# Patient Record
Sex: FEMALE | Race: BLACK OR AFRICAN AMERICAN | NOT HISPANIC OR LATINO | Employment: OTHER | URBAN - METROPOLITAN AREA
[De-identification: names, ages, dates, MRNs, and addresses within clinical notes are randomized per-mention and may not be internally consistent; named-entity substitution may affect disease eponyms.]

---

## 2017-10-07 ENCOUNTER — HOSPITAL ENCOUNTER (OUTPATIENT)
Facility: HOSPITAL | Age: 47
Setting detail: OBSERVATION
End: 2017-10-10
Attending: EMERGENCY MEDICINE | Admitting: INTERNAL MEDICINE
Payer: MEDICAID

## 2017-10-07 ENCOUNTER — APPOINTMENT (EMERGENCY)
Dept: RADIOLOGY | Facility: HOSPITAL | Age: 47
End: 2017-10-07
Payer: MEDICAID

## 2017-10-07 DIAGNOSIS — T51.2X1A: ICD-10-CM

## 2017-10-07 DIAGNOSIS — R45.851 SUICIDAL IDEATION: ICD-10-CM

## 2017-10-07 DIAGNOSIS — R40.4 ALTERED AWARENESS, TRANSIENT: Primary | ICD-10-CM

## 2017-10-07 PROBLEM — R41.82 ALTERED MENTAL STATUS: Status: ACTIVE | Noted: 2017-10-07

## 2017-10-07 PROBLEM — F19.10 DRUG ABUSE (HCC): Status: ACTIVE | Noted: 2017-10-07

## 2017-10-07 PROBLEM — G92.8 TOXIC METABOLIC ENCEPHALOPATHY: Status: ACTIVE | Noted: 2017-10-07

## 2017-10-07 LAB
ACETONE SERPL-MCNC: NEGATIVE MG/DL
ALBUMIN SERPL BCP-MCNC: 4.1 G/DL (ref 3.5–5)
ALP SERPL-CCNC: 83 U/L (ref 46–116)
ALT SERPL W P-5'-P-CCNC: 26 U/L (ref 12–78)
AMPHETAMINES SERPL QL SCN: NEGATIVE
ANION GAP SERPL CALCULATED.3IONS-SCNC: 11 MMOL/L (ref 4–13)
ANION GAP SERPL CALCULATED.3IONS-SCNC: 11 MMOL/L (ref 4–13)
APAP SERPL-MCNC: <2 UG/ML (ref 10–30)
APTT PPP: 24 SECONDS (ref 24–33)
ARTERIAL PATENCY WRIST A: YES
AST SERPL W P-5'-P-CCNC: 17 U/L (ref 5–45)
BACTERIA UR QL AUTO: ABNORMAL /HPF
BARBITURATES UR QL: NEGATIVE
BASE EXCESS BLDA CALC-SCNC: -1.4 MMOL/L
BASOPHILS # BLD AUTO: 0 THOUSANDS/ΜL (ref 0–0.1)
BASOPHILS NFR BLD AUTO: 0 % (ref 0–1)
BENZODIAZ UR QL: NEGATIVE
BILIRUB SERPL-MCNC: 0.3 MG/DL (ref 0.2–1)
BILIRUB UR QL STRIP: NEGATIVE
BODY TEMPERATURE: 97.5 DEGREES FEHRENHEIT
BUN SERPL-MCNC: 6 MG/DL (ref 5–25)
BUN SERPL-MCNC: 9 MG/DL (ref 5–25)
CALCIUM SERPL-MCNC: 8.4 MG/DL (ref 8.3–10.1)
CALCIUM SERPL-MCNC: 9.3 MG/DL (ref 8.3–10.1)
CHLORIDE SERPL-SCNC: 107 MMOL/L (ref 100–108)
CHLORIDE SERPL-SCNC: 112 MMOL/L (ref 100–108)
CLARITY UR: CLEAR
CO2 SERPL-SCNC: 27 MMOL/L (ref 21–32)
CO2 SERPL-SCNC: 28 MMOL/L (ref 21–32)
COCAINE UR QL: NEGATIVE
COLOR UR: ABNORMAL
CREAT SERPL-MCNC: 1.32 MG/DL (ref 0.6–1.3)
CREAT SERPL-MCNC: 2.09 MG/DL (ref 0.6–1.3)
EOSINOPHIL # BLD AUTO: 0.1 THOUSAND/ΜL (ref 0–0.61)
EOSINOPHIL NFR BLD AUTO: 1 % (ref 0–6)
ERYTHROCYTE [DISTWIDTH] IN BLOOD BY AUTOMATED COUNT: 15.6 % (ref 11.6–15.1)
ETHANOL SERPL-MCNC: <3 MG/DL (ref 0–3)
GFR SERPL CREATININE-BSD FRML MDRD: 31 ML/MIN/1.73SQ M
GFR SERPL CREATININE-BSD FRML MDRD: 47 ML/MIN/1.73SQ M
GLUCOSE P FAST SERPL-MCNC: 116 MG/DL (ref 65–99)
GLUCOSE SERPL-MCNC: 116 MG/DL (ref 65–140)
GLUCOSE SERPL-MCNC: 123 MG/DL (ref 65–140)
GLUCOSE SERPL-MCNC: 132 MG/DL (ref 65–140)
GLUCOSE UR STRIP-MCNC: NEGATIVE MG/DL
HCO3 BLDA-SCNC: 23.8 MMOL/L (ref 22–28)
HCT VFR BLD AUTO: 47.6 % (ref 37–47)
HGB BLD-MCNC: 15.5 G/DL (ref 12–16)
HGB UR QL STRIP.AUTO: ABNORMAL
INR PPP: 0.99 (ref 0.86–1.16)
KETONES UR STRIP-MCNC: NEGATIVE MG/DL
LEUKOCYTE ESTERASE UR QL STRIP: NEGATIVE
LYMPHOCYTES # BLD AUTO: 1.6 THOUSANDS/ΜL (ref 0.6–4.47)
LYMPHOCYTES NFR BLD AUTO: 17 % (ref 14–44)
MCH RBC QN AUTO: 28.2 PG (ref 27–31)
MCHC RBC AUTO-ENTMCNC: 32.6 G/DL (ref 31.4–37.4)
MCV RBC AUTO: 87 FL (ref 82–98)
METHADONE UR QL: NEGATIVE
MONOCYTES # BLD AUTO: 0.3 THOUSAND/ΜL (ref 0.17–1.22)
MONOCYTES NFR BLD AUTO: 3 % (ref 4–12)
NEUTROPHILS # BLD AUTO: 7.7 THOUSANDS/ΜL (ref 1.85–7.62)
NEUTS SEG NFR BLD AUTO: 79 % (ref 43–75)
NITRITE UR QL STRIP: NEGATIVE
NON VENT ROOM AIR: ABNORMAL %
NON-SQ EPI CELLS URNS QL MICRO: ABNORMAL /HPF
NRBC BLD AUTO-RTO: 0 /100 WBCS
O2 CT BLDA-SCNC: 20.1 ML/DL (ref 16–23)
OPIATES UR QL SCN: NEGATIVE
OXYHGB MFR BLDA: 91.5 % (ref 94–97)
PCO2 BLDA: 42.2 MM HG (ref 36–44)
PCO2 TEMP ADJ BLDA: 41.1 MM HG (ref 36–44)
PCP UR QL: NEGATIVE
PH BLD: 7.38 [PH] (ref 7.35–7.45)
PH BLDA: 7.37 [PH] (ref 7.35–7.45)
PH UR STRIP.AUTO: 6.5 [PH] (ref 5–9)
PLATELET # BLD AUTO: 228 THOUSANDS/UL (ref 130–400)
PLATELET # BLD AUTO: 263 THOUSANDS/UL (ref 130–400)
PMV BLD AUTO: 7.7 FL (ref 8.9–12.7)
PMV BLD AUTO: 8 FL (ref 8.9–12.7)
PO2 BLD: 84.7 MM HG (ref 75–129)
PO2 BLDA: 88 MM HG (ref 75–129)
POTASSIUM SERPL-SCNC: 3.5 MMOL/L (ref 3.5–5.3)
POTASSIUM SERPL-SCNC: 3.6 MMOL/L (ref 3.5–5.3)
PROT SERPL-MCNC: 7.6 G/DL (ref 6.4–8.2)
PROT UR STRIP-MCNC: NEGATIVE MG/DL
PROTHROMBIN TIME: 10.4 SECONDS (ref 9.4–11.7)
RBC # BLD AUTO: 5.5 MILLION/UL (ref 4.2–5.4)
RBC #/AREA URNS AUTO: ABNORMAL /HPF
SALICYLATES SERPL-MCNC: 3.8 MG/DL (ref 3–20)
SODIUM SERPL-SCNC: 146 MMOL/L (ref 136–145)
SODIUM SERPL-SCNC: 150 MMOL/L (ref 136–145)
SP GR UR STRIP.AUTO: <=1.005 (ref 1–1.03)
SPECIMEN SOURCE: ABNORMAL
THC UR QL: NEGATIVE
TROPONIN I SERPL-MCNC: <0.02 NG/ML
TSH SERPL DL<=0.05 MIU/L-ACNC: 0.46 UIU/ML (ref 0.36–3.74)
UROBILINOGEN UR QL STRIP.AUTO: 0.2 E.U./DL
WBC # BLD AUTO: 9.8 THOUSAND/UL (ref 4.8–10.8)
WBC #/AREA URNS AUTO: ABNORMAL /HPF

## 2017-10-07 PROCEDURE — 82009 KETONE BODYS QUAL: CPT | Performed by: EMERGENCY MEDICINE

## 2017-10-07 PROCEDURE — 84443 ASSAY THYROID STIM HORMONE: CPT | Performed by: EMERGENCY MEDICINE

## 2017-10-07 PROCEDURE — 93005 ELECTROCARDIOGRAM TRACING: CPT | Performed by: EMERGENCY MEDICINE

## 2017-10-07 PROCEDURE — 87081 CULTURE SCREEN ONLY: CPT | Performed by: STUDENT IN AN ORGANIZED HEALTH CARE EDUCATION/TRAINING PROGRAM

## 2017-10-07 PROCEDURE — 85610 PROTHROMBIN TIME: CPT | Performed by: EMERGENCY MEDICINE

## 2017-10-07 PROCEDURE — 80320 DRUG SCREEN QUANTALCOHOLS: CPT | Performed by: STUDENT IN AN ORGANIZED HEALTH CARE EDUCATION/TRAINING PROGRAM

## 2017-10-07 PROCEDURE — 96360 HYDRATION IV INFUSION INIT: CPT

## 2017-10-07 PROCEDURE — 96361 HYDRATE IV INFUSION ADD-ON: CPT

## 2017-10-07 PROCEDURE — 80320 DRUG SCREEN QUANTALCOHOLS: CPT | Performed by: EMERGENCY MEDICINE

## 2017-10-07 PROCEDURE — 85025 COMPLETE CBC W/AUTO DIFF WBC: CPT | Performed by: EMERGENCY MEDICINE

## 2017-10-07 PROCEDURE — 80329 ANALGESICS NON-OPIOID 1 OR 2: CPT | Performed by: EMERGENCY MEDICINE

## 2017-10-07 PROCEDURE — 85730 THROMBOPLASTIN TIME PARTIAL: CPT | Performed by: EMERGENCY MEDICINE

## 2017-10-07 PROCEDURE — 80048 BASIC METABOLIC PNL TOTAL CA: CPT | Performed by: STUDENT IN AN ORGANIZED HEALTH CARE EDUCATION/TRAINING PROGRAM

## 2017-10-07 PROCEDURE — 85049 AUTOMATED PLATELET COUNT: CPT | Performed by: STUDENT IN AN ORGANIZED HEALTH CARE EDUCATION/TRAINING PROGRAM

## 2017-10-07 PROCEDURE — 70450 CT HEAD/BRAIN W/O DYE: CPT

## 2017-10-07 PROCEDURE — 36600 WITHDRAWAL OF ARTERIAL BLOOD: CPT

## 2017-10-07 PROCEDURE — 80307 DRUG TEST PRSMV CHEM ANLYZR: CPT | Performed by: EMERGENCY MEDICINE

## 2017-10-07 PROCEDURE — 36415 COLL VENOUS BLD VENIPUNCTURE: CPT | Performed by: EMERGENCY MEDICINE

## 2017-10-07 PROCEDURE — 99285 EMERGENCY DEPT VISIT HI MDM: CPT

## 2017-10-07 PROCEDURE — 81001 URINALYSIS AUTO W/SCOPE: CPT | Performed by: EMERGENCY MEDICINE

## 2017-10-07 PROCEDURE — 80053 COMPREHEN METABOLIC PANEL: CPT | Performed by: EMERGENCY MEDICINE

## 2017-10-07 PROCEDURE — 82805 BLOOD GASES W/O2 SATURATION: CPT | Performed by: EMERGENCY MEDICINE

## 2017-10-07 PROCEDURE — 84484 ASSAY OF TROPONIN QUANT: CPT | Performed by: EMERGENCY MEDICINE

## 2017-10-07 PROCEDURE — 82948 REAGENT STRIP/BLOOD GLUCOSE: CPT

## 2017-10-07 RX ORDER — NICOTINE 21 MG/24HR
1 PATCH, TRANSDERMAL 24 HOURS TRANSDERMAL DAILY
Status: DISCONTINUED | OUTPATIENT
Start: 2017-10-07 | End: 2017-10-08

## 2017-10-07 RX ORDER — SODIUM CHLORIDE 9 MG/ML
125 INJECTION, SOLUTION INTRAVENOUS CONTINUOUS
Status: DISCONTINUED | OUTPATIENT
Start: 2017-10-07 | End: 2017-10-10

## 2017-10-07 RX ORDER — LORAZEPAM 0.5 MG/1
0.5 TABLET ORAL ONCE AS NEEDED
Status: COMPLETED | OUTPATIENT
Start: 2017-10-07 | End: 2017-10-07

## 2017-10-07 RX ADMIN — LORAZEPAM 0.5 MG: 0.5 TABLET ORAL at 21:15

## 2017-10-07 RX ADMIN — NICOTINE 1 PATCH: 14 PATCH TRANSDERMAL at 15:38

## 2017-10-07 RX ADMIN — SODIUM CHLORIDE 125 ML/HR: 0.9 INJECTION, SOLUTION INTRAVENOUS at 15:39

## 2017-10-07 RX ADMIN — FOLIC ACID 1 MG: 5 INJECTION, SOLUTION INTRAMUSCULAR; INTRAVENOUS; SUBCUTANEOUS at 16:38

## 2017-10-07 RX ADMIN — ENOXAPARIN SODIUM 40 MG: 40 INJECTION SUBCUTANEOUS at 15:38

## 2017-10-07 RX ADMIN — THIAMINE HYDROCHLORIDE 100 MG: 100 INJECTION, SOLUTION INTRAMUSCULAR; INTRAVENOUS at 16:38

## 2017-10-07 RX ADMIN — SODIUM CHLORIDE 1000 ML: 0.9 INJECTION, SOLUTION INTRAVENOUS at 11:05

## 2017-10-07 NOTE — ED NOTES
Sleepy but rouses and goes back to sleep, denies using alcohol or drugs     Wendy Wallis, ANGEL  10/07/17 1128

## 2017-10-07 NOTE — ED NOTES
Remains sleepy but rousable fluids finnished  No family members present   Awaiting transport to floor     Vinny Sen RN  10/07/17 8895

## 2017-10-07 NOTE — ED NOTES
Straight cathed after becoming increasingly restless stating she had to use the bathroom attempted to get pt to use the bedpan, did not understand, not aware of what she is doing     Wendy Wallis, ANGEL  10/07/17 9279

## 2017-10-07 NOTE — H&P
History and Physical - [facility] Medicine    Patient Information: Cuate Higgins 50 y o  female MRN: [id number]  Unit/Bed#: [address] [facility] Encounter: [id number]  Admitting Physician: Teo Stroud MD  PCP: No primary care provider on file  Date of Admission:  10/07/17        Hospital Problem List:     Principal Problem:    Toxic metabolic encephalopathy  Active Problems:    Altered mental status    Isopropanol causing toxic effect, accidental or unintentional, initial encounter    Drug abuse      Assessment/Plan:  · Drug overdose, possibly isopropyl alcohol  Spoke to poison Control  Isopropyl alcohol poisoning is her altered by ketosis without acidosis  Patient's anion gap is normal and she is negative for acetone in the blood  BMP is normal however overdose can cause elevation in creatinine so will repeat BMP later this evening to monitor closely  Monitor electrolytes and replete as needed  IV fluid hydration  Neuro checks every 4 hours  · Toxic metabolic encephalopathy secondary to the above  CT of the head is normal   Urine tox screen was negative  · Reported history of drug abuse, heroin and alcohol  Alcohol level less than 3 on arrival   Urine tox negative  Start thiamine and folate supplementation  Monitor for signs of DT and treat withdrawal symptomatically  Will forego Ativan and Librium protocol right now is patient is lethargic  · 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  Chronic Per ED notes per her son  CT of the head was normal for acute intracranial abnormality  · Code status  Patient is unable to answer as to her wishes and her son was attempted to be reached by phone however he did not answer and has no answering machine  Will make patient full code by default        VTE Prophylaxis: Enoxaparin (Lovenox)  / sequential compression device   Code Status: Level 1 - Full Code    Anticipated Length of Stay:  Patient will be admitted on an Observation basis with an anticipated length of stay of  < 2 midnights  Justification for Hospital Stay: acute drug overdose requiring neuro monitoring and hydration    Total Time for Visit, including Counseling / Coordination of Care: 30 minutes  Greater than 50% of this total time spent on direct patient counseling and coordination of care  Chief Complaint:     Altered Mental Status (son found pt around 21  altered, slow to respond  son found an empty rubbing alcohol next to pt )      History of Present Illness:    Breanna King is a 50 y o  female with a reported PMH drug and alcohol abuse  who presents with altered mental status  Patient is not able to answer any questions and no family is at bedside or reachable by phone therefore entire history is obtained from the chart and ED note  Apparently patient son found her unresponsive at home today  She has a history of drug abuse, heroin and alcohol, and was found to have a half a bottle of rubbing alcohol by her bedside  She did admit to drinking last night to the ER doc  Her son is not aware of any medical history  Patient is not in the system and has never been admitted to this hospital     Review of Systems:    Review of Systems   Unable to perform ROS: Mental status change       Past Medical and Surgical History:     No past medical history on file  No past surgical history on file      Meds/Allergies:    no known PTA meds    Allergies: No Known Allergies    Social History:     Marital Status: Single     Substance Use History:   History   Alcohol Use    Yes     History   Smoking Status    Current Every Day Smoker    Packs/day: 0 50   Smokeless Tobacco    Not on file     History   Drug Use    Types: Heroin     Comment: heroin in the past per son       Family History:    unable to obtain    Physical Exam:     Vitals:   Blood Pressure: 104/61 (10/07/17 1255)  Pulse: 95 (10/07/17 1255)  Temperature: 98 °F (36 7 °C) (10/07/17 1255)  Temp Source: Tympanic (10/07/17 1255)  Respirations: 20 (10/07/17 1255)  Weight - Scale: 59 kg (130 lb) (10/07/17 1056)  SpO2: 98 % (10/07/17 1056)    Physical Exam   Constitutional: She appears well-developed  She appears lethargic  No distress  disheveled, appears chronically ill   HENT:   Head: Normocephalic and atraumatic  Dry mucous membranes   Eyes: Conjunctivae and EOM are normal  No scleral icterus  Right pupil a 6 mm nonreactive, left pupil is 2 mm and sluggish (per ER note, her son states this is normal after a hx of right eye injury)   Neck: Neck supple  No JVD present  Cardiovascular: Normal rate, regular rhythm and normal heart sounds  No murmur heard  Pulmonary/Chest: Effort normal and breath sounds normal  No respiratory distress  She has no wheezes  She has no rales  Repeated sighing   Abdominal: Soft  Bowel sounds are normal  She exhibits no distension  There is no tenderness  There is no rebound  Musculoskeletal: Normal range of motion  She exhibits no edema, tenderness or deformity  Neurological: She appears lethargic  No cranial nerve deficit  Repeated sighing  States her name and is oriented to place (hospital) but otherwise minimally verbal and not answering any questions     Skin: Skin is warm and dry  No rash noted  She is not diaphoretic  No erythema  No pallor  Psychiatric: Her affect is blunt  She is inattentive  Lab Results: I have personally reviewed pertinent reports          Results from last 7 days  Lab Units 10/07/17  1107   WBC Thousand/uL 9 80   HEMOGLOBIN g/dL 15 5   HEMATOCRIT % 47 6*   PLATELETS Thousands/uL 263   NEUTROS PCT % 79*   LYMPHS PCT % 17   MONOS PCT % 3*   EOS PCT % 1       Results from last 7 days  Lab Units 10/07/17  1107   SODIUM mmol/L 146*   POTASSIUM mmol/L 3 6   CHLORIDE mmol/L 107   CO2 mmol/L 28   BUN mg/dL 9   CREATININE mg/dL 1 32*   CALCIUM mg/dL 9 3   TOTAL PROTEIN g/dL 7 6   BILIRUBIN TOTAL mg/dL 0 30   ALK PHOS U/L 83   ALT U/L 26   AST U/L 17   GLUCOSE RANDOM mg/dL 123       Results from last 7 days  Lab Units 10/07/17  1107   INR  0 99       Imaging: I have personally reviewed pertinent reports  Ct Head Without Contrast    Result Date: 10/7/2017  Narrative: CT BRAIN - WITHOUT CONTRAST INDICATION:  Change in mental status  COMPARISON:  None  TECHNIQUE:  CT examination of the brain was performed  In addition to axial images, coronal reformatted images were created and submitted for interpretation  Radiation dose length product (DLP) for this visit:  1236 44 mGy-cm   This examination, like all CT scans performed in the [facility], was performed utilizing techniques to minimize radiation dose exposure, including the use of iterative reconstruction and automated exposure control  IMAGE QUALITY:  Diagnostic  FINDINGS:  PARENCHYMA:  No intracranial mass, mass effect or midline shift  No CT signs of acute infarction  There is no parenchymal hemorrhage  VENTRICLES AND EXTRA-AXIAL SPACES:  Normal for patient's age  VISUALIZED ORBITS AND PARANASAL SINUSES:  Left maxillary and ethmoid sinusitis  Old left lamina papyracea fracture  CALVARIUM AND EXTRACRANIAL SOFT TISSUES:   Normal      Impression: No acute intracranial abnormality  Left maxillary and ethmoid sinusitis  Workstation performed: [id number]       CT head without contrast   Final Result      No acute intracranial abnormality  Left maxillary and ethmoid sinusitis  Workstation performed: [id number]             EKG, Pathology, and Other Studies Reviewed on Admission:   · NSR    Allscripts Records Reviewed: Yes     ** Please Note: Dragon 360 Dictation voice to text software may have been used in the creation of this document   **

## 2017-10-07 NOTE — ED NOTES
Not awake enough to attempt dysphagia assessment, promptly goes back to sleep after waking her     Blossom Rivera, ANGEL  10/07/17 4495

## 2017-10-07 NOTE — PLAN OF CARE
Problem: Potential for Falls  Goal: Patient will remain free of falls  INTERVENTIONS:  - Assess patient frequently for physical needs  -  Identify cognitive and physical deficits and behaviors that affect risk of falls    -  [place] fall precautions as indicated by assessment   - Educate patient/family on patient safety including physical limitations  - Instruct patient to call for assistance with activity based on assessment  - Modify environment to reduce risk of injury  - Consider OT/PT consult to assist with strengthening/mobility   Outcome: Not Progressing      Problem: Prexisting or High Potential for Compromised Skin Integrity  Goal: Skin integrity is maintained or improved  INTERVENTIONS:  - Identify patients at risk for skin breakdown  - Assess and monitor skin integrity  - Assess and monitor nutrition and hydration status  - Monitor labs (i e  albumin)  - Assess for incontinence   - Turn and reposition patient  - Assist with mobility/ambulation  - Relieve pressure over bony prominences  - Avoid friction and shearing  - Provide appropriate hygiene as needed including keeping skin clean and dry  - Evaluate need for skin moisturizer/barrier cream  - Collaborate with interdisciplinary team (i e  Nutrition, Rehabilitation, etc )   - Patient/family teaching   Outcome: Not Progressing

## 2017-10-07 NOTE — ED PROVIDER NOTES
History  Chief Complaint   Patient presents with    Altered Mental Status     son found pt around 21  altered, slow to respond  son found an empty rubbing alcohol next to pt  Patient is a 47-year-old female that was brought in by her son after he states he heard her fall in her room he went to check on her she was behind the door and not responding eventually got and picked her up, patient is reportedly has a history of drug abuse heroin being the drug of choice, the son also states he noted a half a bottle of rubbing alcohol that was by the bedside  Patient is arousable with verbal stimuli she is answering questions slowly but somewhat appropriately  Patient denies any pain but other than that she is unable to give much of a history, patient does admit to drinking last night but when asked about drug use she did not answer  The patient reportedly has no medical history that the son is aware of and she is not on any regular medications that he is aware of  The history is somewhat limited by her mental status at this point  Patient is moving all extremities equally has no facial or cranial nerve deficits she does have a enlarged right pupil compared to the left but the son states she had an old injury to that eye and this is not new  None       No past medical history on file  No past surgical history on file  No family history on file  I have reviewed and agree with the history as documented  Social History   Substance Use Topics    Smoking status: Current Every Day Smoker     Packs/day: 0 50    Smokeless tobacco: Not on file    Alcohol use Yes        Review of Systems   Unable to perform ROS: Mental status change   Cardiovascular: Negative for chest pain  Gastrointestinal: Negative for abdominal pain, nausea and vomiting  Musculoskeletal: Negative for neck pain         Physical Exam  ED Triage Vitals   Temperature Pulse Respirations Blood Pressure SpO2   10/07/17 1056 10/07/17 1056 10/07/17 1056 10/07/17 1056 10/07/17 1056   97 5 °F (36 4 °C) 94 18 95/50 98 %      Temp Source Heart Rate Source Patient Position - Orthostatic VS BP Location FiO2 (%)   10/07/17 1056 10/07/17 1056 10/07/17 1056 10/07/17 1056 --   Tympanic Monitor Lying Right arm       Pain Score       10/07/17 1255       No Pain           Physical Exam   Constitutional: She appears well-developed and well-nourished  She appears lethargic  HENT:   Head: Normocephalic and atraumatic  Eyes: Conjunctivae, EOM and lids are normal  Pupils are unequal    Left pupil is approximately 6 mm right pupil is approximately 2 mm according to his son this is her normal state because of an old eye injury to the right  Cardiovascular: Normal rate and regular rhythm  Pulmonary/Chest: Effort normal and breath sounds normal  No respiratory distress  Abdominal: Soft  She exhibits no distension  There is no tenderness  Musculoskeletal: Normal range of motion  She exhibits no edema  Neurological: She appears lethargic  No cranial nerve deficit  She exhibits normal muscle tone  Skin: Skin is warm and dry  Capillary refill takes less than 2 seconds  Nursing note and vitals reviewed        ED Medications  Medications   sodium chloride 0 9 % bolus 1,000 mL (1,000 mL Intravenous New Bag 10/7/17 1105)       Diagnostic Studies  Labs Reviewed   CBC AND DIFFERENTIAL - Abnormal        Result Value Ref Range Status    RBC 5 50 (*) 4 20 - 5 40 Million/uL Final    Hematocrit 47 6 (*) 37 0 - 47 0 % Final    RDW 15 6 (*) 11 6 - 15 1 % Final    MPV 8 0 (*) 8 9 - 12 7 fL Final    Neutrophils Relative 79 (*) 43 - 75 % Final    Monocytes Relative 3 (*) 4 - 12 % Final    Neutrophils Absolute 7 70 (*) 1 85 - 7 62 Thousands/µL Final    WBC 9 80  4 80 - 10 80 Thousand/uL Final    Hemoglobin 15 5  12 0 - 16 0 g/dL Final    MCV 87  82 - 98 fL Final    MCH 28 2  27 0 - 31 0 pg Final    MCHC 32 6  31 4 - 37 4 g/dL Final    Platelets 356  929 - 400 Thousands/uL Final    nRBC 0  /100 WBCs Final    Lymphocytes Relative 17  14 - 44 % Final    Eosinophils Relative 1  0 - 6 % Final    Basophils Relative 0  0 - 1 % Final    Lymphocytes Absolute 1 60  0 60 - 4 47 Thousands/µL Final    Monocytes Absolute 0 30  0 17 - 1 22 Thousand/µL Final    Eosinophils Absolute 0 10  0 00 - 0 61 Thousand/µL Final    Basophils Absolute 0 00  0 00 - 0 10 Thousands/µL Final   URINALYSIS WITH REFLEX TO MICROSCOPIC - Abnormal     Blood, UA Trace-lysed (*) Negative Final    Color, UA Light Yellow   Final    Clarity, UA Clear   Final    Specific Gravity, UA <=1 005  1 000 - 1 030 Final    pH, UA 6 5  5 0 - 9 0 Final    Leukocytes, UA Negative  Negative Final    Nitrite, UA Negative  Negative Final    Protein, UA Negative  Negative mg/dl Final    Glucose, UA Negative  Negative mg/dl Final    Ketones, UA Negative  Negative mg/dl Final    Urobilinogen, UA 0 2  0 2, 1 0 E U /dl E U /dl Final    Bilirubin, UA Negative  Negative Final   COMPREHENSIVE METABOLIC PANEL - Abnormal     Sodium 146 (*) 136 - 145 mmol/L Final    Creatinine 1 32 (*) 0 60 - 1 30 mg/dL Final    Comment: Standardized to IDMS reference method    Potassium 3 6  3 5 - 5 3 mmol/L Final    Chloride 107  100 - 108 mmol/L Final    CO2 28  21 - 32 mmol/L Final    Anion Gap 11  4 - 13 mmol/L Final    BUN 9  5 - 25 mg/dL Final    Glucose 123  65 - 140 mg/dL Final    Comment:   If the patient is fasting, the ADA then defines impaired fasting glucose as > 100 mg/dL and diabetes as > or equal to 123 mg/dL  Specimen collection should occur prior to Sulfasalazine administration due to the potential for falsely depressed results  Specimen collection should occur prior to Sulfapyridine administration due to the potential for falsely elevated results      Calcium 9 3  8 3 - 10 1 mg/dL Final    AST 17  5 - 45 U/L Final    Comment:   Specimen collection should occur prior to Sulfasalazine administration due to the potential for falsely depressed results  ALT 26  12 - 78 U/L Final    Comment:   Specimen collection should occur prior to Sulfasalazine administration due to the potential for falsely depressed results  Alkaline Phosphatase 83  46 - 116 U/L Final    Total Protein 7 6  6 4 - 8 2 g/dL Final    Albumin 4 1  3 5 - 5 0 g/dL Final    Total Bilirubin 0 30  0 20 - 1 00 mg/dL Final    eGFR 47  ml/min/1 73sq m Final    Narrative:     National Kidney Disease Education Program recommendations are as follows:  GFR calculation is accurate only with a steady state creatinine  Chronic Kidney disease less than 60 ml/min/1 73 sq  meters  Kidney failure less than 15 ml/min/1 73 sq  meters  BLOOD GAS, ARTERIAL - Abnormal     O2 HGB,Arterial  91 5 (*) 94 0 - 97 0 % Final    pH, Arterial 7 370  7 350 - 7 450 Final    PH ART TC 7 379  7 350 - 7 450 Final    pCO2, Arterial 42 2  36 0 - 44 0 mm Hg Final    PCO2 (TC) Arterial 41 1  36 0 - 44 0 mm Hg Final    pO2, Arterial 88 0  75 0 - 129 0 mm Hg Final    PO2 (TC) Arterial 84 7  75 0 - 129 0 mm Hg Final    HCO3, Arterial 23 8  22 0 - 28 0 mmol/L Final    Base Excess, Arterial -1 4  mmol/L Final    O2 Content, Arterial 20 1  16 0 - 23 0 mL/dL Final    SOURCE Radial, Right   Final    MAREN TEST Yes   Final    Temperature 97 5  Degrees Fehrenheit Final    ROOM AIR FIO2 21%  % Final   ACETAMINOPHEN LEVEL - Abnormal     Acetaminophen Level <2 (*) 10 - 30 ug/mL Final   URINE MICROSCOPIC - Abnormal     RBC, UA 0-1 (*) None Seen, 0-5 /hpf Final    WBC, UA 1-2 (*) None Seen, 0-5, 5-55, 5-65 /hpf Final    Epithelial Cells Occasional  None Seen, Occasional /hpf Final    Bacteria, UA Occasional  None Seen, Occasional /hpf Final   PROTIME-INR - Normal    Protime 10 4  9 4 - 11 7 seconds Final    INR 0 99  0 86 - 1 16 Final   APTT - Normal    PTT 24  24 - 33 seconds Final    Narrative:      Therapeutic Heparin Range = 60-90 seconds   RAPID DRUG SCREEN, URINE - Normal    Amph/Meth UR Negative  Negative Final Barbiturate Ur Negative  Negative Final    Benzodiazepine Urine Negative  Negative Final    Cocaine Urine Negative  Negative Final    Methadone Urine Negative  Negative Final    Opiate Urine Negative  Negative Final    PCP Ur Negative  Negative Final    THC Urine Negative  Negative Final    Narrative:     FOR MEDICAL PURPOSES ONLY  IF CONFIRMATION NEEDED PLEASE CONTACT THE LAB WITHIN 5 DAYS  Drug Screen Cutoff Levels:  AMPHETAMINE/METHAMPHETAMINES  1000 ng/mL  BARBITURATES     200 ng/mL  BENZODIAZEPINES     200 ng/mL  COCAINE      300 ng/mL  METHADONE      300 ng/mL  OPIATES      300 ng/mL  PHENCYCLIDINE     25 ng/mL  THC       50 ng/mL   TSH, 3RD GENERATION - Normal    TSH 3RD GENERATON 0 459  0 358 - 3 740 uIU/mL Final    Narrative:     Patients undergoing fluorescein dye angiography may retain small amounts of fluorescein in the body for 48-72 hours post procedure  Samples containing fluorescein can produce falsely depressed TSH values  If the patient had this procedure,a specimen should be resubmitted post fluorescein clearance  The recommended reference ranges for TSH during pregnancy are as follows:  First trimester 0 1 to 2 5 uIU/mL  Second trimester  0 2 to 3 0 uIU/mL  Third trimester 0 3 to 3 0 uIU/m     MEDICAL ALCOHOL - Normal    Ethanol Lvl <3  0 - 3 mg/dL Final    Comment: 3   TROPONIN I - Normal    Troponin I <0 02  <=0 04 ng/mL Final    Comment: 3Autovalidation override    Narrative:     [employer] Chemistry analyzer 99% cutoff is > 0 04 ng/mL in network labs    o cTnI 99% cutoff is useful only when applied to patients in the clinical setting of myocardial ischemia  o cTnI 99% cutoff should be interpreted in the context of clinical history, ECG findings and possibly cardiac imaging to establish correct diagnosis  o cTnI 99% cutoff may be suggestive but clearly not indicative of a coronary event without the clinical setting of myocardial ischemia     SALICYLATE LEVEL - Normal    Salicylate Lvl 3 8  3 - 20 mg/dL Final   ACETONE - Normal    Acetone, Bld Negative  Negative Final   POCT GLUCOSE - Normal    POC Glucose 132  65 - 140 mg/dl Final    Comment: MD NotifiedRN NotifiedThe result was performed at [address]: [address] [facility], [address]       CT head without contrast   Final Result      No acute intracranial abnormality  Left maxillary and ethmoid sinusitis  Workstation performed: [id number]             Procedures  ECG 12 Lead Documentation  Date/Time: 10/7/2017 10:48 AM  Performed by: Nora Coto  Authorized by: Nora Coto     Indications / Diagnosis:  Ams  Patient location:  ED  Previous ECG:     Previous ECG:  Unavailable  Interpretation:     Interpretation: normal    Rate:     ECG rate:  89    ECG rate assessment: normal    Rhythm:     Rhythm: sinus rhythm    QRS:     QRS axis:  Normal  Conduction:     Conduction: normal    ST segments:     ST segments:  Normal  T waves:     T waves: normal            Phone Contacts  ED Phone Contact    ED Course  ED Course                                MDM  Number of Diagnoses or Management Options  Diagnosis management comments: Altered mental status, differential includes but not restricted to rubbing alcohol intoxication, CVA, seizure, and myocardial infarction  The workup includes a normal CT scan, normal EKG, normal electrolytes  Ethanol level was normal but he will see that with isopropyl alcohol intoxication  Patient will be admitted for observation with the altered mental status  I spoke with patient's son and told the plan he is in agreement keep her and watching her         Amount and/or Complexity of Data Reviewed  Clinical lab tests: ordered and reviewed  Tests in the radiology section of CPT®: ordered and reviewed      The patient presented with a condition in which there was a high probability of imminent or life-threatening deterioration, and critical care services (excluding separately billable procedures) totalled 30-74 minutes  Disposition  Final diagnoses: Altered awareness, transient   Isopropanol causing toxic effect, accidental or unintentional, initial encounter     ED Disposition     ED Disposition Condition Comment    Admit  Case was discussed with Dr Radha Gonzales and the patient's admission status was agreed to be Admission Status: observation status to the service of Dr Yoni Perez   Follow-up Information    None       Patient's Medications    No medications on file     No discharge procedures on file      ED Provider  Electronically Signed by       Surekha Saunders MD  10/07/17 8602

## 2017-10-07 NOTE — ED NOTES
Returned from radiology, pt urinated on floor over there and took out one of her iv's   Placed back on monitor     Erasto Ramires RN  10/07/17 7257

## 2017-10-08 LAB
ACETONE SERPL-MCNC: 110 MG/DL
ACETONE SERPL-MCNC: POSITIVE MG/DL
ALBUMIN SERPL BCP-MCNC: 3.6 G/DL (ref 3.5–5)
ALP SERPL-CCNC: 67 U/L (ref 46–116)
ALT SERPL W P-5'-P-CCNC: 19 U/L (ref 12–78)
ANION GAP SERPL CALCULATED.3IONS-SCNC: 10 MMOL/L (ref 4–13)
AST SERPL W P-5'-P-CCNC: 13 U/L (ref 5–45)
BILIRUB SERPL-MCNC: 0.3 MG/DL (ref 0.2–1)
BUN SERPL-MCNC: 6 MG/DL (ref 5–25)
CALCIUM SERPL-MCNC: 9 MG/DL (ref 8.3–10.1)
CHLORIDE SERPL-SCNC: 109 MMOL/L (ref 100–108)
CK SERPL-CCNC: 89 U/L (ref 26–192)
CO2 SERPL-SCNC: 26 MMOL/L (ref 21–32)
CREAT SERPL-MCNC: 1.91 MG/DL (ref 0.6–1.3)
ERYTHROCYTE [DISTWIDTH] IN BLOOD BY AUTOMATED COUNT: 15.6 % (ref 11.6–15.1)
ETHANOL SERPL-MSCNC: NEGATIVE
GFR SERPL CREATININE-BSD FRML MDRD: 35 ML/MIN/1.73SQ M
GLUCOSE P FAST SERPL-MCNC: 104 MG/DL (ref 65–99)
GLUCOSE SERPL-MCNC: 104 MG/DL (ref 65–140)
HCT VFR BLD AUTO: 41.2 % (ref 37–47)
HGB BLD-MCNC: 13.3 G/DL (ref 12–16)
ISOPROPANOL BLD-MCNC: 134 MG/DL
ISOPROPANOL SPEC-SCNC: POSITIVE MMOL/L
MAGNESIUM SERPL-MCNC: 2.2 MG/DL (ref 1.6–2.6)
MCH RBC QN AUTO: 28.2 PG (ref 27–31)
MCHC RBC AUTO-ENTMCNC: 32.4 G/DL (ref 31.4–37.4)
MCV RBC AUTO: 87 FL (ref 82–98)
METHANOL SERPL-MCNC: NEGATIVE MG/DL
PHOSPHATE SERPL-MCNC: 3.9 MG/DL (ref 2.7–4.5)
PLATELET # BLD AUTO: 230 THOUSANDS/UL (ref 130–400)
PMV BLD AUTO: 7.7 FL (ref 8.9–12.7)
POTASSIUM SERPL-SCNC: 3.5 MMOL/L (ref 3.5–5.3)
PROT SERPL-MCNC: 6.7 G/DL (ref 6.4–8.2)
RBC # BLD AUTO: 4.73 MILLION/UL (ref 4.2–5.4)
SODIUM SERPL-SCNC: 145 MMOL/L (ref 136–145)
WBC # BLD AUTO: 9.8 THOUSAND/UL (ref 4.8–10.8)

## 2017-10-08 PROCEDURE — 80053 COMPREHEN METABOLIC PANEL: CPT | Performed by: STUDENT IN AN ORGANIZED HEALTH CARE EDUCATION/TRAINING PROGRAM

## 2017-10-08 PROCEDURE — 84100 ASSAY OF PHOSPHORUS: CPT | Performed by: STUDENT IN AN ORGANIZED HEALTH CARE EDUCATION/TRAINING PROGRAM

## 2017-10-08 PROCEDURE — 85027 COMPLETE CBC AUTOMATED: CPT | Performed by: STUDENT IN AN ORGANIZED HEALTH CARE EDUCATION/TRAINING PROGRAM

## 2017-10-08 PROCEDURE — 82550 ASSAY OF CK (CPK): CPT | Performed by: STUDENT IN AN ORGANIZED HEALTH CARE EDUCATION/TRAINING PROGRAM

## 2017-10-08 PROCEDURE — 83735 ASSAY OF MAGNESIUM: CPT | Performed by: STUDENT IN AN ORGANIZED HEALTH CARE EDUCATION/TRAINING PROGRAM

## 2017-10-08 RX ORDER — NICOTINE 21 MG/24HR
21 PATCH, TRANSDERMAL 24 HOURS TRANSDERMAL DAILY
Status: DISCONTINUED | OUTPATIENT
Start: 2017-10-08 | End: 2017-10-11 | Stop reason: HOSPADM

## 2017-10-08 RX ORDER — LORAZEPAM 0.5 MG/1
0.5 TABLET ORAL ONCE AS NEEDED
Status: COMPLETED | OUTPATIENT
Start: 2017-10-08 | End: 2017-10-08

## 2017-10-08 RX ORDER — LORAZEPAM 2 MG/ML
1 INJECTION INTRAMUSCULAR EVERY 4 HOURS PRN
Status: DISCONTINUED | OUTPATIENT
Start: 2017-10-08 | End: 2017-10-11 | Stop reason: HOSPADM

## 2017-10-08 RX ORDER — HALOPERIDOL 5 MG
5 TABLET ORAL 2 TIMES DAILY
Status: DISCONTINUED | OUTPATIENT
Start: 2017-10-08 | End: 2017-10-11 | Stop reason: HOSPADM

## 2017-10-08 RX ORDER — CHLORDIAZEPOXIDE HYDROCHLORIDE 25 MG/1
25 CAPSULE, GELATIN COATED ORAL EVERY 6 HOURS SCHEDULED
Status: DISCONTINUED | OUTPATIENT
Start: 2017-10-08 | End: 2017-10-10

## 2017-10-08 RX ORDER — HALOPERIDOL 5 MG
5 TABLET ORAL 2 TIMES DAILY
Status: DISCONTINUED | OUTPATIENT
Start: 2017-10-08 | End: 2017-10-08

## 2017-10-08 RX ORDER — DIVALPROEX SODIUM 500 MG/1
500 TABLET, EXTENDED RELEASE ORAL
Status: DISCONTINUED | OUTPATIENT
Start: 2017-10-08 | End: 2017-10-11 | Stop reason: HOSPADM

## 2017-10-08 RX ADMIN — CHLORDIAZEPOXIDE HYDROCHLORIDE 25 MG: 25 CAPSULE ORAL at 23:36

## 2017-10-08 RX ADMIN — NICOTINE 21 MG: 21 PATCH, EXTENDED RELEASE TRANSDERMAL at 09:54

## 2017-10-08 RX ADMIN — CHLORDIAZEPOXIDE HYDROCHLORIDE 25 MG: 25 CAPSULE ORAL at 12:35

## 2017-10-08 RX ADMIN — ENOXAPARIN SODIUM 40 MG: 40 INJECTION SUBCUTANEOUS at 09:58

## 2017-10-08 RX ADMIN — LORAZEPAM 1 MG: 2 INJECTION INTRAMUSCULAR; INTRAVENOUS at 15:07

## 2017-10-08 RX ADMIN — DIVALPROEX SODIUM 500 MG: 500 TABLET, EXTENDED RELEASE ORAL at 22:10

## 2017-10-08 RX ADMIN — FOLIC ACID 1 MG: 5 INJECTION, SOLUTION INTRAMUSCULAR; INTRAVENOUS; SUBCUTANEOUS at 10:28

## 2017-10-08 RX ADMIN — HALOPERIDOL 5 MG: 5 TABLET ORAL at 15:50

## 2017-10-08 RX ADMIN — SODIUM CHLORIDE 150 ML/HR: 0.9 INJECTION, SOLUTION INTRAVENOUS at 15:52

## 2017-10-08 RX ADMIN — CHLORDIAZEPOXIDE HYDROCHLORIDE 25 MG: 25 CAPSULE ORAL at 17:57

## 2017-10-08 RX ADMIN — LORAZEPAM 1 MG: 2 INJECTION INTRAMUSCULAR; INTRAVENOUS at 19:03

## 2017-10-08 RX ADMIN — THIAMINE HYDROCHLORIDE 100 MG: 100 INJECTION, SOLUTION INTRAMUSCULAR; INTRAVENOUS at 15:50

## 2017-10-08 RX ADMIN — LORAZEPAM 0.5 MG: 0.5 TABLET ORAL at 03:05

## 2017-10-08 NOTE — CONSULTS
Consultation - Marlene Brown 50 y o  female MRN: [id number]    Unit/Bed#: [address] 422[address]01 Encounter: [id number]      Identifying Data:  50years old black female is admitted at [facility] on October 7, 2017 with confusion psychiatric consultation is asked for suicide attempts he could reportedly patient drank a rubbing alcohol to kill herself  Spoke with the nurse history physical labs reviewed and noted  Reportedly patient moved in with the son and daughter in law from [place] 3 weeks ago reportedly some family conflicts she is not getting along with son's wife  Patient also has history of drug and alcohol abuse and son were not buying alcohol anymore patient also reports that she is very depressed she used to take Haldol and lithium in near but she is not taking it since over a year and she wants to give her Haldol when I asked her with this she drank rubbing alcohol to kill herself she clearly stated Milton Jurado discussed with the nurse that patient is admits that she took an overdose of rubbing alcohol to kill herself  The patient is a poor historian and she kept asking me to order Haldol which is helping her  She also admits that she needs help and she is depressed she will go for help to psychiatric hospital   Reportedly patient has an extensive history of substance abuse and mental illness  Currently patient lives with the son she has 1 daughter and 1 son she smokes cigarette she drinks alcohol she has an extensive history of alcohol abuse but no DUIs she went to 2 rehabs in the past she also gives an extensive history of drugs abuse she has snorted heroin and cocaine but no other drugs and no IV drug abuse she has 12th grade education and she did work in [employer] but currently she is not working  Patient is irritated anxious and looks depressed  Currently patient is on one-to-one observation for suicidal precaution    Drug screen shows 134  isosorbide ahi450 acetone level    Chief Complaints:  Overdose of rubbing alcohol to kill herself  Family History: No family history on file  Patient denies  Legal History:  Patient denies  Mental Status Exam:  50years old black female is resting in the bed alert awake oriented x3 irritated poor historian but she was able to communicate her feelings well memory intact  Patient is feeling suicidal severely depressed she looks anxious guarded and paranoid currently patient denies auditory or visual hallucinations  Patient is jean easily irritated she has severe mood swings poor sleep or appetite she looks mild nourished  Patient is noncompliance of the treatments currently she has not seen a psychiatrist and she is not taking her psychotropic medications  Insight and judgments are adequate  History of Present Illness     HPI: Anna Hernandez is a 50y o  year old female who presents with suicide attempt with drinking rubbing alcohol to kill herself and she was confused when came to the hospital     Consults      Historical Information   Past Psychiatric History:  Patient has an extensive psych history she had seen a psychiatrist and she was on psychotropic medications she reported that she used to take Haldol and lithium but she has been off the medications since more than a year  Patient has extensive polysubstance abuse history alcohol and drugs she has at least 2 rehabs in the past she denies any DUI in the past and she has been doing drugs heroin and cocaine no IV drug abuse but she snorts drugs  Possibly patient did not have an excess of alcohol and she might have drank rubbing alcohol but she clearly reports that she took rubbing alcohol to kill herself at this time  Patient denies history of suicide attempts in the past she admits that she had at least 1 psychiatric admission in the past   No past medical history on file  No past surgical history on file    Social History   History   Alcohol Use    Yes     History   Drug Use  Types: Heroin     Comment: heroin in the past per son     History   Smoking Status    Current Every Day Smoker    Packs/day: 0 50   Smokeless Tobacco    Not on file       Meds/Allergies   current meds:   Current Facility-Administered Medications   Medication Dose Route Frequency    chlordiazePOXIDE (LIBRIUM) capsule 25 mg  25 mg Oral Q6H [facility]    enoxaparin (LOVENOX) subcutaneous injection 40 mg  40 mg Subcutaneous Daily    folic acid 1 mg in sodium chloride 0 9 % 50 mL IVPB  1 mg Intravenous Daily    LORazepam (ATIVAN) 2 mg/mL injection 1 mg  1 mg Intravenous Q4H PRN    nicotine (NICODERM CQ) 21 mg/24 hr TD 24 hr patch 21 mg  21 mg Transdermal Daily    sodium chloride 0 9 % infusion  150 mL/hr Intravenous Continuous    thiamine (VITAMIN B1) 100 mg in sodium chloride 0 9 % 50 mL IVPB  100 mg Intravenous Q24H    and PTA meds:    No prescriptions prior to admission  No Known Allergies    Objective   Vitals: Blood pressure 104/66, pulse 88, temperature 98 3 °F (36 8 °C), temperature source Oral, resp  rate 20, height 5' 7" (1 702 m), weight 59 2 kg (130 lb 8 oz), SpO2 98 %        Routine Lab Results:   Admission on 10/07/2017   Component Date Value Ref Range Status    WBC 10/07/2017 9 80  4 80 - 10 80 Thousand/uL Final    RBC 10/07/2017 5 50* 4 20 - 5 40 Million/uL Final    Hemoglobin 10/07/2017 15 5  12 0 - 16 0 g/dL Final    Hematocrit 10/07/2017 47 6* 37 0 - 47 0 % Final    MCV 10/07/2017 87  82 - 98 fL Final    MCH 10/07/2017 28 2  27 0 - 31 0 pg Final    MCHC 10/07/2017 32 6  31 4 - 37 4 g/dL Final    RDW 10/07/2017 15 6* 11 6 - 15 1 % Final    MPV 10/07/2017 8 0* 8 9 - 12 7 fL Final    Platelets 19/33/6590 263  130 - 400 Thousands/uL Final    nRBC 10/07/2017 0  /100 WBCs Final    Neutrophils Relative 10/07/2017 79* 43 - 75 % Final    Lymphocytes Relative 10/07/2017 17  14 - 44 % Final    Monocytes Relative 10/07/2017 3* 4 - 12 % Final    Eosinophils Relative 10/07/2017 1  0 - 6 % Final    Basophils Relative 10/07/2017 0  0 - 1 % Final    Neutrophils Absolute 10/07/2017 7 70* 1 85 - 7 62 Thousands/µL Final    Lymphocytes Absolute 10/07/2017 1 60  0 60 - 4 47 Thousands/µL Final    Monocytes Absolute 10/07/2017 0 30  0 17 - 1 22 Thousand/µL Final    Eosinophils Absolute 10/07/2017 0 10  0 00 - 0 61 Thousand/µL Final    Basophils Absolute 10/07/2017 0 00  0 00 - 0 10 Thousands/µL Final    Protime 10/07/2017 10 4  9 4 - 11 7 seconds Final    INR 10/07/2017 0 99  0 86 - 1 16 Final    PTT 10/07/2017 24  24 - 33 seconds Final    Amph/Meth UR 10/07/2017 Negative  Negative Final    Barbiturate Ur 10/07/2017 Negative  Negative Final    Benzodiazepine Urine 10/07/2017 Negative  Negative Final    Cocaine Urine 10/07/2017 Negative  Negative Final    Methadone Urine 10/07/2017 Negative  Negative Final    Opiate Urine 10/07/2017 Negative  Negative Final    PCP Ur 10/07/2017 Negative  Negative Final    THC Urine 10/07/2017 Negative  Negative Final    Color, UA 10/07/2017 Light Yellow   Final    Clarity, UA 10/07/2017 Clear   Final    Specific Gravity, UA 10/07/2017 <=1 005  1 000 - 1 030 Final    pH, UA 10/07/2017 6 5  5 0 - 9 0 Final    Leukocytes, UA 10/07/2017 Negative  Negative Final    Nitrite, UA 10/07/2017 Negative  Negative Final    Protein, UA 10/07/2017 Negative  Negative mg/dl Final    Glucose, UA 10/07/2017 Negative  Negative mg/dl Final    Ketones, UA 10/07/2017 Negative  Negative mg/dl Final    Urobilinogen, UA 10/07/2017 0 2  0 2, 1 0 E U /dl E U /dl Final    Bilirubin, UA 10/07/2017 Negative  Negative Final    Blood, UA 10/07/2017 Trace-lysed* Negative Final    Sodium 10/07/2017 146* 136 - 145 mmol/L Final    Potassium 10/07/2017 3 6  3 5 - 5 3 mmol/L Final    Chloride 10/07/2017 107  100 - 108 mmol/L Final    CO2 10/07/2017 28  21 - 32 mmol/L Final    Anion Gap 10/07/2017 11  4 - 13 mmol/L Final    BUN 10/07/2017 9  5 - 25 mg/dL Final    Creatinine 10/07/2017 1 32* 0 60 - 1 30 mg/dL Final    Glucose 10/07/2017 123  65 - 140 mg/dL Final    Calcium 10/07/2017 9 3  8 3 - 10 1 mg/dL Final    AST 10/07/2017 17  5 - 45 U/L Final    ALT 10/07/2017 26  12 - 78 U/L Final    Alkaline Phosphatase 10/07/2017 83  46 - 116 U/L Final    Total Protein 10/07/2017 7 6  6 4 - 8 2 g/dL Final    Albumin 10/07/2017 4 1  3 5 - 5 0 g/dL Final    Total Bilirubin 10/07/2017 0 30  0 20 - 1 00 mg/dL Final    eGFR 10/07/2017 47  ml/min/1 73sq m Final    TSH 3RD GENERATON 10/07/2017 0 459  0 358 - 3 740 uIU/mL Final    Ethanol Lvl 10/07/2017 <3  0 - 3 mg/dL Final    Troponin I 10/07/2017 <0 02  <=0 04 ng/mL Final    POC Glucose 10/07/2017 132  65 - 140 mg/dl Final    pH, Arterial 10/07/2017 7 370  7 350 - 7 450 Final    PH ART TC 10/07/2017 7 379  7 350 - 7 450 Final    pCO2, Arterial 10/07/2017 42 2  36 0 - 44 0 mm Hg Final    PCO2 (TC) Arterial 10/07/2017 41 1  36 0 - 44 0 mm Hg Final    pO2, Arterial 10/07/2017 88 0  75 0 - 129 0 mm Hg Final    PO2 (TC) Arterial 10/07/2017 84 7  75 0 - 129 0 mm Hg Final    HCO3, Arterial 10/07/2017 23 8  22 0 - 28 0 mmol/L Final    Base Excess, Arterial 10/07/2017 -1 4  mmol/L Final    O2 Content, Arterial 10/07/2017 20 1  16 0 - 23 0 mL/dL Final    O2 HGB,Arterial  10/07/2017 91 5* 94 0 - 97 0 % Final    SOURCE 10/07/2017 Radial, Right   Final    MAREN TEST 10/07/2017 Yes   Final    Temperature 10/07/2017 97 5  Degrees Fehrenheit Final    ROOM AIR FIO2 10/07/2017 18%  % Final    Salicylate Lvl 96/35/6547 3 8  3 - 20 mg/dL Final    Acetaminophen Level 10/07/2017 <2* 10 - 30 ug/mL Final    Acetone, Bld 10/07/2017 Negative  Negative Final    RBC, UA 10/07/2017 0-1* None Seen, 0-5 /hpf Final    WBC, UA 10/07/2017 1-2* None Seen, 0-5, 5-55, 5-65 /hpf Final    Epithelial Cells 10/07/2017 Occasional  None Seen, Occasional /hpf Final    Bacteria, UA 10/07/2017 Occasional  None Seen, Occasional /hpf Final    Acetone 10/08/2017 Positive* Negative Final    Acetone, Quant 10/08/2017 110  mg/dL Final    Ethanol 10/08/2017 Negative  Negative Final    Isopropanol 10/08/2017 Positive* Negative Final    Isopropanol 10/08/2017 134  mg/dL Final    Methanol 10/08/2017 Negative  Negative Final    Platelets 97/19/3855 228  130 - 400 Thousands/uL Final    MPV 10/07/2017 7 7* 8 9 - 12 7 fL Final    Sodium 10/07/2017 150* 136 - 145 mmol/L Final    Potassium 10/07/2017 3 5  3 5 - 5 3 mmol/L Final    Chloride 10/07/2017 112* 100 - 108 mmol/L Final    CO2 10/07/2017 27  21 - 32 mmol/L Final    Anion Gap 10/07/2017 11  4 - 13 mmol/L Final    BUN 10/07/2017 6  5 - 25 mg/dL Final    Creatinine 10/07/2017 2 09* 0 60 - 1 30 mg/dL Final    Glucose 10/07/2017 116  65 - 140 mg/dL Final    Glucose, Fasting 10/07/2017 116* 65 - 99 mg/dL Final    Calcium 10/07/2017 8 4  8 3 - 10 1 mg/dL Final    eGFR 10/07/2017 31  ml/min/1 73sq m Final    WBC 10/08/2017 9 80  4 80 - 10 80 Thousand/uL Final    RBC 10/08/2017 4 73  4 20 - 5 40 Million/uL Final    Hemoglobin 10/08/2017 13 3  12 0 - 16 0 g/dL Final    Hematocrit 10/08/2017 41 2  37 0 - 47 0 % Final    MCV 10/08/2017 87  82 - 98 fL Final    MCH 10/08/2017 28 2  27 0 - 31 0 pg Final    MCHC 10/08/2017 32 4  31 4 - 37 4 g/dL Final    RDW 10/08/2017 15 6* 11 6 - 15 1 % Final    Platelets 29/44/6408 230  130 - 400 Thousands/uL Final    MPV 10/08/2017 7 7* 8 9 - 12 7 fL Final    Sodium 10/08/2017 145  136 - 145 mmol/L Final    Potassium 10/08/2017 3 5  3 5 - 5 3 mmol/L Final    Chloride 10/08/2017 109* 100 - 108 mmol/L Final    CO2 10/08/2017 26  21 - 32 mmol/L Final    Anion Gap 10/08/2017 10  4 - 13 mmol/L Final    BUN 10/08/2017 6  5 - 25 mg/dL Final    Creatinine 10/08/2017 1 91* 0 60 - 1 30 mg/dL Final    Glucose 10/08/2017 104  65 - 140 mg/dL Final    Glucose, Fasting 10/08/2017 104* 65 - 99 mg/dL Final    Calcium 10/08/2017 9 0  8 3 - 10 1 mg/dL Final    AST 10/08/2017 13  5 - 45 U/L Final    ALT 10/08/2017 19  12 - 78 U/L Final    Alkaline Phosphatase 10/08/2017 67  46 - 116 U/L Final    Total Protein 10/08/2017 6 7  6 4 - 8 2 g/dL Final    Albumin 10/08/2017 3 6  3 5 - 5 0 g/dL Final    Total Bilirubin 10/08/2017 0 30  0 20 - 1 00 mg/dL Final    eGFR 10/08/2017 35  ml/min/1 73sq m Final    Magnesium 10/08/2017 2 2  1 6 - 2 6 mg/dL Final    Phosphorus 10/08/2017 3 9  2 7 - 4 5 mg/dL Final    Total CK 10/08/2017 89  26 - 192 U/L Final         Diagnosis:  Bipolar disorder mixed type  Alcohol and drug abuse  Anxiety  Adjustment disorder with emotional features  Rule out family conflicts  Noncompliance of the recommended treatments  Patient has been not seen a psychiatrist and she has been not taking her medications since over a year  Plan:  Continue one-to-one suicidal precaution  Transferred to inpatient psychiatric care for further treatment and stabilization after the medical clearance  Psychotherapy  Haldol 5 mg p  o  b i d  Depakote  mg PO HS  I will follow up during the hospital stay  Thank you very much      Mallika Lucas MD

## 2017-10-08 NOTE — CASE MANAGEMENT
Initial Clinical Review    Admission: Date/Time/Statement:  Placed in observation status  On 10/7 @ 12:56    Orders Placed This Encounter   Procedures    Place in Observation (expected length of stay for this patient is less than two midnights)     Standing Status:   Standing     Number of Occurrences:   1     Order Specific Question:   Admitting Physician     Answer:   Bolivar Abdi     Order Specific Question:   Level of Care     Answer:   Med Surg [16]         ED: Date/Time/Mode of Arrival:   ED Arrival Information     Expected Arrival Acuity Means of Arrival Escorted By Service Admission Type    - 10/7/2017 10:43 Emergent Wheelchair Family Member General Medicine Emergency    Arrival Complaint    POSSIBLE OVERDOSE          Chief Complaint:   Chief Complaint   Patient presents with    Altered Mental Status     son found pt around 21  altered, slow to respond  son found an empty rubbing alcohol next to pt  History of Illness: Jorge A Ames is a 50 y o  female with a reported PMH drug and alcohol abuse  who presents with altered mental status  Patient is not able to answer any questions and no family is at bedside or reachable by phone therefore entire history is obtained from the chart and ED note  Apparently patient son found her unresponsive at home today  She has a history of drug abuse, heroin and alcohol, and was found to have a half a bottle of rubbing alcohol by her bedside  She did admit to drinking last night to the ER doc  Her son is not aware of any medical history    Patient is not in the system and has never been admitted to this hospital     ED Vital Signs:   ED Triage Vitals   Temperature Pulse Respirations Blood Pressure SpO2   10/07/17 1056 10/07/17 1056 10/07/17 1056 10/07/17 1056 10/07/17 1056   97 5 °F (36 4 °C) 94 18 95/50 98 % RA sat      Temp Source Heart Rate Source Patient Position - Orthostatic VS BP Location FiO2 (%)   10/07/17 1056 10/07/17 1056 10/07/17 1056 10/07/17 1056 --   Tympanic Monitor Lying Right arm       Pain Score       10/07/17 1255       No Pain        Wt Readings from Last 1 Encounters:   10/07/17 59 2 kg (130 lb 8 oz)         Abnormal Labs/Diagnostic Test Results:   Ref Range & Units 10/8/17 0521 10/7/17 1946   Sodium 136 - 145 mmol/L 145 150    Potassium 3 5 - 5 3 mmol/L 3 5 3 5   Chloride 100 - 108 mmol/L 109  112    CO2 21 - 32 mmol/L 26 27   Anion Gap 4 - 13 mmol/L 10 11   BUN 5 - 25 mg/dL 6 6   Creatinine 0 60 - 1 30 mg/dL 1 91  2 09CM    Glucose 65 - 140 mg/dL 104 116CM   Glucose, Fasting 65 - 99 mg/dL 104  116CM    Calcium 8 3 - 10 1 mg/dL 9 0 8 4   AST 5 - 45 U/L 13    ALT 12 - 78 U/L 19    Alkaline Phosphatase 46 - 116 U/L 67    Total Protein 6 4 - 8 2 g/dL 6 7    Albumin 3 5 - 5 0 g/dL 3 6    Total Bilirubin 0 20 - 1 00 mg/dL 0 30    eGFR ml/min/1 73sq m 35 31            Ref Range & Units 10/8/17 0521 10/7/17 1946   WBC 4 80 - 10 80 Thousand/uL 9 80    RBC 4 20 - 5 40 Million/uL 4 73    Hemoglobin 12 0 - 16 0 g/dL 13 3    Hematocrit 37 0 - 47 0 % 41 2    MCV 82 - 98 fL 87    MCH 27 0 - 31 0 pg 28 2    MCHC 31 4 - 37 4 g/dL 32 4    RDW 11 6 - 15 1 % 15 6     Platelets 997 - 857 Thousands/uL 230 228   MPV 8 9 - 12 7 fL 7 7  7 7            Urine Drug screen - negative     Ref Range & Units 10/7/17 1222   Color, UA   Light Yellow   Clarity, UA  Clear   Specific Gravity, UA 1 000 - 1 030 <=1 005   pH, UA 5 0 - 9 0 6 5   Leukocytes, UA Negative  Negative   Nitrite, UA Negative  Negative   Protein, UA Negative mg/dl  Negative   Glucose, UA Negative mg/dl  Negative   Ketones, UA Negative mg/dl  Negative   Urobilinogen, UA 0 2, 1 0 E U /dl E U /dl 0 2   Bilirubin, UA Negative  Negative   Blood, UA Negative  Trace-lysed            CT Head - no acute    ED Treatment:   Medication Administration from 10/07/2017 1043 to 10/07/2017 1422       Date/Time Order Dose Route Action Action by Comments     10/07/2017 1105 sodium chloride 0 9 % bolus 1,000 mL 1,000 mL Intravenous New Bag Nicol Ohara, [facility]           Past Medical/Surgical History:     No Additional Past Medical History       Admitting Diagnosis: Altered mental status [R41 82]  Altered awareness, transient [R40 4]  Isopropanol causing toxic effect, accidental or unintentional, initial encounter [T51 2X1A]    Age/Sex: 50 y o  female      Assessment/Plan:  · Drug overdose, possibly isopropyl alcohol  Spoke to poison Control  Isopropyl alcohol poisoning is her altered by ketosis without acidosis  Patient's anion gap is normal and she is negative for acetone in the blood  BMP is normal however overdose can cause elevation in creatinine so will repeat BMP later this evening to monitor closely  Monitor electrolytes and replete as needed  IV fluid hydration  Neuro checks every 4 hours  · Toxic metabolic encephalopathy secondary to the above  CT of the head is normal   Urine tox screen was negative  · Reported history of drug abuse, heroin and alcohol  Alcohol level less than 3 on arrival   Urine tox negative  Start thiamine and folate supplementation  Monitor for signs of DT and treat withdrawal symptomatically  Will forego Ativan and Librium protocol right now is patient is lethargic  · 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  Chronic Per ED notes per her son  CT of the head was normal for acute intracranial abnormality  · Code status  Patient is unable to answer as to her wishes and her son was attempted to be reached by phone however he did not answer and has no answering machine    Will make patient full code by default        Admission Orders:  Scheduled Meds:   enoxaparin 40 mg Subcutaneous Daily   folic acid IVPB 1 mg Intravenous Daily   nicotine 21 mg Transdermal Daily   thiamine (VITAMIN B1) 50 mL IVPB 100 mg Intravenous Q24H     Continuous Infusions:   sodium chloride 125 mL/hr Last Rate: 125 mL/hr (10/07/17 1539)     PRN Meds:     Nursing Orders - 1:1 behavioral health watch - Vs q 4 -  Bedrest with bedside commode - Diet regular house -     Consult Behavioral health

## 2017-10-08 NOTE — PROGRESS NOTES
Notified by nursing staff that the pt was more alert now and tearful  When questioned she states that she "just wants to kill herself"  At this time, Ill place her on suicide precautions, continue the 1:1 observation and place a Psychiatry consult order

## 2017-10-08 NOTE — PROGRESS NOTES
[facility] Progress Note  Patient: Daysi Salgado 50 y o  female   MRN: [id number]  PCP: No primary care provider on file  Unit/Bed#: 4 [facility] 422-01 Encounter: [id number]  Date Of Visit: 10/08/17    Problem List:    Principal Problem:    Toxic metabolic encephalopathy  Active Problems:    Altered mental status    Isopropanol causing toxic effect, accidental or unintentional, initial encounter    Drug abuse      Assessment & Plan:    · Toxic metabolic encephalopathy-  Improving  Patient more alert now but still has times of confusion and agitation  Falls risk, flight risk  Cont to monitor on 1:1  · Isopropanol overdose- patients serum positive for isopropanol and acetone  Today she admits to drinking rubbing alcohol  poison control called yesterday, patient expected to have ketosis without acidosis, RENE  Treat conservatively with IVF  · RENE- renal function worsened yesterday but is now improving  Will increase IVF and check CK for rhabdo  · Possible suicidal ideation- placed on 1:1 obs  Psych consulted  · H/o EtOH abuse/ EtOH withdrawal- start librium protocol, ativan PRN agitation  · H/o drug abuse, including heroin and cocaine  · Chronic pain- Patient aggressively seeking narcotics this morning  will not give any narcotics because of patients hx  Tylenol PRN  Cannot give toradol 2/2 RENE  tx anxiety with ativan  · Nicotine dependence- Nicotine patch given  Advised smoking cessation      VTE Pharmacologic Prophylaxis:   Pharmacologic: Enoxaparin (Lovenox)  Mechanical VTE Prophylaxis in Place: Yes    Patient Centered Rounds: I have performed bedside rounds with nursing staff today  Discussions with Specialists or Other Care Team Provider: Yes    Education and Discussions with Family / Patient:Yes    Time Spent for Care: 30 minutes  More than 50% of total time spent on counseling and coordination of care as described above      Current Length of Stay: 0 day(s)    Current Patient Status: Observation     Discharge Plan: inpatient drug rehab    Code Status: Level 1 - Full Code      Subjective:   Renetta Henson is more awake and alert today  She is able to have a conversation  She says she did drink rubbing alcohol but denies having a drug or alcohol problem  She says she is in significant pain, all over  No specific location  She asked for "narcotic" specifically  I spoke to her son and his wife who are at beside  They state that she had had a long standing hx of drug abuse, heroin and cocaine  She has been in inpatient rehab in the past  She moved in with them 2 months ago and they noticed that she was drinking excessively which was not an issue in the past  They refused to buy her more alcohol and then found her with a bottle of rubbing alcohol next her passed out later that same day  Per overnight physician note, patient stated she wanted to kill herself to the nurse and was placed on 1:1 suicide watch  ROS Negative for Palpitations, Shortness of Breath, Nausea, Vomiting, Constipation, Diarrhea, Dizziness  All other 10 review of systems negative and without drastic interval changes from yesterday  Objective:       Vitals:   Temp (24hrs), Av 2 °F (36 8 °C), Min:98 °F (36 7 °C), Max:98 3 °F (36 8 °C)    HR:  [] 88  Resp:  [20-24] 20  BP: ()/(52-70) 104/66  SpO2:  [91 %-99 %] 98 %  Body mass index is 20 44 kg/m²  Input and Output Summary (last 24 hours): Intake/Output Summary (Last 24 hours) at 10/08/17 1155  Last data filed at 10/07/17 1337   Gross per 24 hour   Intake             1000 ml   Output             1350 ml   Net             -350 ml       Physical Exam:     Physical Exam   Constitutional: She is oriented to person, place, and time  She appears well-developed  dischevelled   HENT:   Head: Normocephalic and atraumatic  Mouth/Throat: No oropharyngeal exudate  Eyes: No scleral icterus     Right pupil 6mm and fixed, left pupil 3mm and reactive   Neck: Neck supple  Cardiovascular: Normal rate, regular rhythm and normal heart sounds  Pulmonary/Chest: Effort normal and breath sounds normal  No respiratory distress  She has no wheezes  Kussmaul breathing   Abdominal: Soft  Bowel sounds are normal  She exhibits no distension  There is tenderness  There is no rebound and no guarding  Musculoskeletal: Normal range of motion  She exhibits tenderness  She exhibits no edema or deformity  Had tenderness to any touch in any body part which is disproportional to exam   Neurological: She is alert and oriented to person, place, and time  No tremors   Skin: Skin is warm and dry  Psychiatric: Her mood appears anxious  Her affect is labile  Her speech is delayed  She is agitated  Cognition and memory are impaired  She expresses impulsivity  She expresses suicidal (stated to her son that she wants to kill herself just prior to my arrival) ideation  She is inattentive  Additional Data:     Labs:      Results from last 7 days  Lab Units 10/08/17  0521  10/07/17  1107   WBC Thousand/uL 9 80  --  9 80   HEMOGLOBIN g/dL 13 3  --  15 5   HEMATOCRIT % 41 2  --  47 6*   PLATELETS Thousands/uL 230  < > 263   NEUTROS PCT %  --   --  79*   LYMPHS PCT %  --   --  17   MONOS PCT %  --   --  3*   EOS PCT %  --   --  1   < > = values in this interval not displayed  Results from last 7 days  Lab Units 10/08/17  0521   SODIUM mmol/L 145   POTASSIUM mmol/L 3 5   CHLORIDE mmol/L 109*   CO2 mmol/L 26   BUN mg/dL 6   CREATININE mg/dL 1 91*   CALCIUM mg/dL 9 0   TOTAL PROTEIN g/dL 6 7   BILIRUBIN TOTAL mg/dL 0 30   ALK PHOS U/L 67   ALT U/L 19   AST U/L 13   GLUCOSE RANDOM mg/dL 104       Results from last 7 days  Lab Units 10/07/17  1107   INR  0 99       * I Have Reviewed All Lab Data Listed Above  * Additional Pertinent Lab Tests Reviewed:  All Labs For Current Hospital Admission Reviewed    Imaging:  Ct Head Without Contrast    Result Date: 10/7/2017  Narrative: CT BRAIN - WITHOUT CONTRAST INDICATION:  Change in mental status  COMPARISON:  None  TECHNIQUE:  CT examination of the brain was performed  In addition to axial images, coronal reformatted images were created and submitted for interpretation  Radiation dose length product (DLP) for this visit:  1236 44 mGy-cm   This examination, like all CT scans performed in the [facility], was performed utilizing techniques to minimize radiation dose exposure, including the use of iterative reconstruction and automated exposure control  IMAGE QUALITY:  Diagnostic  FINDINGS:  PARENCHYMA:  No intracranial mass, mass effect or midline shift  No CT signs of acute infarction  There is no parenchymal hemorrhage  VENTRICLES AND EXTRA-AXIAL SPACES:  Normal for patient's age  VISUALIZED ORBITS AND PARANASAL SINUSES:  Left maxillary and ethmoid sinusitis  Old left lamina papyracea fracture  CALVARIUM AND EXTRACRANIAL SOFT TISSUES:   Normal      Impression: No acute intracranial abnormality  Left maxillary and ethmoid sinusitis  Workstation performed: [id number]     Imaging Reports Reviewed by myself    Cultures:   Blood Culture: No results found for: BLOODCX  Urine Culture: No results found for: URINECX  Sputum Culture: No components found for: SPUTUMCX  Wound Culture: No results found for: WOUNDCULT    Last 24 Hours Medication List:     enoxaparin 40 mg Subcutaneous Daily   folic acid IVPB 1 mg Intravenous Daily   nicotine 21 mg Transdermal Daily   thiamine (VITAMIN B1) 50 mL IVPB 100 mg Intravenous Q24H        Today, Patient Was Seen By: Mildred He MD    ** Please Note: Dragon 360 Dictation voice to text software may have been used in the creation of this document   **

## 2017-10-09 ENCOUNTER — APPOINTMENT (OUTPATIENT)
Dept: RADIOLOGY | Facility: HOSPITAL | Age: 47
End: 2017-10-09
Payer: MEDICAID

## 2017-10-09 PROBLEM — F33.9 RECURRENT MAJOR DEPRESSIVE DISORDER (HCC): Status: ACTIVE | Noted: 2017-10-09

## 2017-10-09 PROBLEM — G92.8 TOXIC METABOLIC ENCEPHALOPATHY: Status: RESOLVED | Noted: 2017-10-07 | Resolved: 2017-10-09

## 2017-10-09 PROBLEM — R45.851 SUICIDAL IDEATION: Status: ACTIVE | Noted: 2017-10-09

## 2017-10-09 PROBLEM — R41.82 ALTERED MENTAL STATUS: Status: RESOLVED | Noted: 2017-10-07 | Resolved: 2017-10-09

## 2017-10-09 LAB
ALBUMIN SERPL BCP-MCNC: 3.1 G/DL (ref 3.5–5)
ALP SERPL-CCNC: 73 U/L (ref 46–116)
ALT SERPL W P-5'-P-CCNC: 21 U/L (ref 12–78)
ANION GAP SERPL CALCULATED.3IONS-SCNC: 7 MMOL/L (ref 4–13)
AST SERPL W P-5'-P-CCNC: 13 U/L (ref 5–45)
ATRIAL RATE: 89 BPM
BACTERIA UR QL AUTO: ABNORMAL /HPF
BILIRUB SERPL-MCNC: 0.2 MG/DL (ref 0.2–1)
BILIRUB UR QL STRIP: NEGATIVE
BUN SERPL-MCNC: 7 MG/DL (ref 5–25)
CALCIUM SERPL-MCNC: 8.4 MG/DL (ref 8.3–10.1)
CHLORIDE SERPL-SCNC: 112 MMOL/L (ref 100–108)
CLARITY UR: CLEAR
CO2 SERPL-SCNC: 26 MMOL/L (ref 21–32)
COLOR UR: ABNORMAL
CREAT SERPL-MCNC: 1.19 MG/DL (ref 0.6–1.3)
ERYTHROCYTE [DISTWIDTH] IN BLOOD BY AUTOMATED COUNT: 15.9 % (ref 11.6–15.1)
GFR SERPL CREATININE-BSD FRML MDRD: 62 ML/MIN/1.73SQ M
GLUCOSE SERPL-MCNC: 96 MG/DL (ref 65–140)
GLUCOSE UR STRIP-MCNC: NEGATIVE MG/DL
HCG SERPL QL: NEGATIVE
HCT VFR BLD AUTO: 37.9 % (ref 37–47)
HGB BLD-MCNC: 12.4 G/DL (ref 12–16)
HGB UR QL STRIP.AUTO: ABNORMAL
KETONES UR STRIP-MCNC: NEGATIVE MG/DL
LEUKOCYTE ESTERASE UR QL STRIP: NEGATIVE
MCH RBC QN AUTO: 28.3 PG (ref 27–31)
MCHC RBC AUTO-ENTMCNC: 32.7 G/DL (ref 31.4–37.4)
MCV RBC AUTO: 87 FL (ref 82–98)
MRSA NOSE QL CULT: NORMAL
NITRITE UR QL STRIP: NEGATIVE
NON-SQ EPI CELLS URNS QL MICRO: ABNORMAL /HPF
P AXIS: 79 DEGREES
PH UR STRIP.AUTO: 7.5 [PH] (ref 5–9)
PLATELET # BLD AUTO: 195 THOUSANDS/UL (ref 130–400)
PMV BLD AUTO: 7.9 FL (ref 8.9–12.7)
POTASSIUM SERPL-SCNC: 3.8 MMOL/L (ref 3.5–5.3)
PR INTERVAL: 174 MS
PROT SERPL-MCNC: 6 G/DL (ref 6.4–8.2)
PROT UR STRIP-MCNC: NEGATIVE MG/DL
QRS AXIS: 32 DEGREES
QRSD INTERVAL: 82 MS
QT INTERVAL: 370 MS
QTC INTERVAL: 450 MS
RBC # BLD AUTO: 4.37 MILLION/UL (ref 4.2–5.4)
RBC #/AREA URNS AUTO: ABNORMAL /HPF
SODIUM SERPL-SCNC: 145 MMOL/L (ref 136–145)
SP GR UR STRIP.AUTO: 1.01 (ref 1–1.03)
T WAVE AXIS: 63 DEGREES
UROBILINOGEN UR QL STRIP.AUTO: 0.2 E.U./DL
VENTRICULAR RATE: 89 BPM
WBC # BLD AUTO: 6.6 THOUSAND/UL (ref 4.8–10.8)
WBC #/AREA URNS AUTO: ABNORMAL /HPF

## 2017-10-09 PROCEDURE — 93005 ELECTROCARDIOGRAM TRACING: CPT | Performed by: STUDENT IN AN ORGANIZED HEALTH CARE EDUCATION/TRAINING PROGRAM

## 2017-10-09 PROCEDURE — 71010 HB CHEST X-RAY 1 VIEW FRONTAL (PORTABLE): CPT

## 2017-10-09 PROCEDURE — 81001 URINALYSIS AUTO W/SCOPE: CPT | Performed by: STUDENT IN AN ORGANIZED HEALTH CARE EDUCATION/TRAINING PROGRAM

## 2017-10-09 PROCEDURE — 80053 COMPREHEN METABOLIC PANEL: CPT | Performed by: STUDENT IN AN ORGANIZED HEALTH CARE EDUCATION/TRAINING PROGRAM

## 2017-10-09 PROCEDURE — 85027 COMPLETE CBC AUTOMATED: CPT | Performed by: STUDENT IN AN ORGANIZED HEALTH CARE EDUCATION/TRAINING PROGRAM

## 2017-10-09 PROCEDURE — 84703 CHORIONIC GONADOTROPIN ASSAY: CPT | Performed by: STUDENT IN AN ORGANIZED HEALTH CARE EDUCATION/TRAINING PROGRAM

## 2017-10-09 RX ORDER — KETOROLAC TROMETHAMINE 30 MG/ML
15 INJECTION, SOLUTION INTRAMUSCULAR; INTRAVENOUS EVERY 6 HOURS PRN
Status: DISCONTINUED | OUTPATIENT
Start: 2017-10-09 | End: 2017-10-10

## 2017-10-09 RX ADMIN — LORAZEPAM 1 MG: 2 INJECTION INTRAMUSCULAR; INTRAVENOUS at 13:40

## 2017-10-09 RX ADMIN — FOLIC ACID 1 MG: 5 INJECTION, SOLUTION INTRAMUSCULAR; INTRAVENOUS; SUBCUTANEOUS at 08:13

## 2017-10-09 RX ADMIN — CHLORDIAZEPOXIDE HYDROCHLORIDE 25 MG: 25 CAPSULE ORAL at 17:58

## 2017-10-09 RX ADMIN — SODIUM CHLORIDE 125 ML/HR: 0.9 INJECTION, SOLUTION INTRAVENOUS at 20:41

## 2017-10-09 RX ADMIN — LORAZEPAM 1 MG: 2 INJECTION INTRAMUSCULAR; INTRAVENOUS at 08:14

## 2017-10-09 RX ADMIN — LORAZEPAM 1 MG: 2 INJECTION INTRAMUSCULAR; INTRAVENOUS at 18:43

## 2017-10-09 RX ADMIN — DIVALPROEX SODIUM 500 MG: 500 TABLET, EXTENDED RELEASE ORAL at 22:22

## 2017-10-09 RX ADMIN — HALOPERIDOL 5 MG: 5 TABLET ORAL at 17:58

## 2017-10-09 RX ADMIN — ENOXAPARIN SODIUM 40 MG: 40 INJECTION SUBCUTANEOUS at 08:14

## 2017-10-09 RX ADMIN — THIAMINE HYDROCHLORIDE 100 MG: 100 INJECTION, SOLUTION INTRAMUSCULAR; INTRAVENOUS at 18:34

## 2017-10-09 RX ADMIN — NICOTINE 21 MG: 21 PATCH, EXTENDED RELEASE TRANSDERMAL at 08:13

## 2017-10-09 RX ADMIN — LORAZEPAM 1 MG: 2 INJECTION INTRAMUSCULAR; INTRAVENOUS at 04:41

## 2017-10-09 RX ADMIN — KETOROLAC TROMETHAMINE 15 MG: 30 INJECTION, SOLUTION INTRAMUSCULAR at 20:36

## 2017-10-09 RX ADMIN — HALOPERIDOL 5 MG: 5 TABLET ORAL at 08:13

## 2017-10-09 RX ADMIN — CHLORDIAZEPOXIDE HYDROCHLORIDE 25 MG: 25 CAPSULE ORAL at 11:48

## 2017-10-09 RX ADMIN — SODIUM CHLORIDE 150 ML/HR: 0.9 INJECTION, SOLUTION INTRAVENOUS at 12:39

## 2017-10-09 RX ADMIN — SODIUM CHLORIDE 150 ML/HR: 0.9 INJECTION, SOLUTION INTRAVENOUS at 05:33

## 2017-10-09 RX ADMIN — CHLORDIAZEPOXIDE HYDROCHLORIDE 25 MG: 25 CAPSULE ORAL at 05:32

## 2017-10-09 NOTE — PLAN OF CARE
Potential for Falls     Patient will remain free of falls Progressing        Prexisting or High Potential for Compromised Skin Integrity     Skin integrity is maintained or improved Progressing        Plan of care discussed with Pt and Pt is progressing

## 2017-10-09 NOTE — PROGRESS NOTES
Pt's physician called [facility] about 16:10 to report the pt was medically cleared  [facility] spoke to pt who agree's to a voluntary inpt psychiatric admission  Chart reviews (unable top print ekg); chest xray needs to be ordered - spoke with pt's RN - Moses Krause about 16:30 to address these 2 areas  Spoke with [facility] - they are willing to review the case but want the complete chart sent only once  Called radiology reading room [phone] to determine why chest xray was not read - was ordered as "rountine" instead of "stat" - they will have chest xray read for [facility] shortly  Chart faxed to [facility] @ 20:30 - called to verify receipt and spoke with Raz Anderson  Chart will be reviewed in the morning; pt's Rn advised and asked to inform pt  Spoke with Raz Anderson from [facility] about 22:45 - the have the complete chart and will review in am tomorrow

## 2017-10-09 NOTE — PROGRESS NOTES
Patient examined spoke with the nurse and the staff  Patient is more alert today and she was able to communicate her feelings well and she reports that she feels better and thanking me for ordering Haldol and Depakote for bipolar disorder patient admits being severely depressed and she was off the medications since over a year patient is explained the need for inpatient psychiatric care after the medical stabilization  Patient is severely depressed noncompliance of the treatments she has ongoing conflicts with the family and she will benefit from inpatient psychiatric care before she go home  Patient will need outpatient psychiatric follow-up with the psychiatrist and therapist after the discharge from the psychiatric hospital   No side effects of Haldol and Depakote noted patient is not lethargic  I will continue one-to-one suicidal precaution and patient will be transferred to inpatient psychiatric care for further treatment and stabilization after the medical stabilization  Thank you very much  Doug Sparks

## 2017-10-09 NOTE — PROGRESS NOTES
[facility] Progress Note  Patient: Liang Cintron 50 y o  female   MRN: [id number]  PCP: No primary care provider on file  Unit/Bed#: 4 [facility] 422-01 Encounter: [id number]  Date Of Visit: 10/09/17    Problem List:    Principal Problem:    Suicidal ideation  Active Problems:    Isopropanol causing toxic effect, accidental or unintentional, initial encounter    Drug abuse    Recurrent major depressive disorder ([facility] [address] )      Assessment & Plan:    · Toxic metabolic encephalopathy resolved  ·  RENE- resolved  DC IVF hydration  Patient tolerating PO  · IsoPropanol overdose  Treated conservatively with IV fluids  · Depression/anxiety with Suicidal ideation  cleared for inpatient psych  Crisis called and will come evaluate the patient  Ativan PRN anxiety  · H/o Etoh abuse- on librium protocol for WD, will DC in AM, ativan PRN   · H/o drug abuse, including cocaine and heroin  · Chronic pain- avoid narcotics 2/2 the above  · Nicotine dependence- nicotine patch       VTE Pharmacologic Prophylaxis:   Pharmacologic: Enoxaparin (Lovenox)  Mechanical VTE Prophylaxis in Place: Yes    Patient Centered Rounds: I have performed bedside rounds with nursing staff today  Discussions with Specialists or Other Care Team Provider: Yes    Education and Discussions with Family / Patient:Yes    Time Spent for Care: 30 minutes  More than 50% of total time spent on counseling and coordination of care as described above  Current Length of Stay: 0 day(s)    Current Patient Status: Observation     Discharge Plan: inpatient psychiatry    Code Status: Level 1 - Full Code      Subjective:   Ronaldo Ogden is complaining of being very depressed, had thoughts of killing herself  Unsure if she hears voices or its her own mind telling her these things  Wants more ativan  Also has "pain all over" and asking for narcotics again     Negative for Chest Pain, Palpitations, Shortness of Breath, Abdominal Pain, Nausea, Vomiting, Constipation, Diarrhea, Dizziness  All other 10 review of systems negative and without drastic interval changes from yesterday  Objective:       Vitals:   Temp (24hrs), Av 7 °F (37 1 °C), Min:98 4 °F (36 9 °C), Max:99 °F (37 2 °C)    HR:  [87-90] 87  Resp:  [18-20] 18  BP: (109-113)/(55-73) 113/73  SpO2:  [97 %-100 %] 100 %  Body mass index is 20 44 kg/m²  Input and Output Summary (last 24 hours):     No intake or output data in the 24 hours ending 10/09/17 1607    Physical Exam:     Physical Exam   Constitutional: She is oriented to person, place, and time  She appears well-developed  No distress  dischevelled   Cardiovascular: Normal rate, regular rhythm and normal heart sounds  Pulmonary/Chest: Effort normal and breath sounds normal  No respiratory distress  She has no wheezes  She has no rales  Abdominal: Soft  Bowel sounds are normal  She exhibits no distension  There is no tenderness  There is no rebound  Musculoskeletal: She exhibits no edema  Neurological: She is alert and oriented to person, place, and time  Skin: She is not diaphoretic  Psychiatric: Her mood appears anxious  She is inattentive  Additional Data:     Labs:      Results from last 7 days  Lab Units 10/09/17  0935  10/07/17  1107   WBC Thousand/uL 6 60  < > 9 80   HEMOGLOBIN g/dL 12 4  < > 15 5   HEMATOCRIT % 37 9  < > 47 6*   PLATELETS Thousands/uL 195  < > 263   NEUTROS PCT %  --   --  79*   LYMPHS PCT %  --   --  17   MONOS PCT %  --   --  3*   EOS PCT %  --   --  1   < > = values in this interval not displayed      Results from last 7 days  Lab Units 10/09/17  0935   SODIUM mmol/L 145   POTASSIUM mmol/L 3 8   CHLORIDE mmol/L 112*   CO2 mmol/L 26   BUN mg/dL 7   CREATININE mg/dL 1 19   CALCIUM mg/dL 8 4   TOTAL PROTEIN g/dL 6 0*   BILIRUBIN TOTAL mg/dL 0 20   ALK PHOS U/L 73   ALT U/L 21   AST U/L 13   GLUCOSE RANDOM mg/dL 96       Results from last 7 days  Lab Units 10/07/17  1107   INR  0 99       * I Have Reviewed All Lab Data Listed Above  * Additional Pertinent Lab Tests Reviewed: [address] Admission Reviewed    Imaging:  Ct Head Without Contrast    Result Date: 10/7/2017  Narrative: CT BRAIN - WITHOUT CONTRAST INDICATION:  Change in mental status  COMPARISON:  None  TECHNIQUE:  CT examination of the brain was performed  In addition to axial images, coronal reformatted images were created and submitted for interpretation  Radiation dose length product (DLP) for this visit:  1236 44 mGy-cm   This examination, like all CT scans performed in the [facility], was performed utilizing techniques to minimize radiation dose exposure, including the use of iterative reconstruction and automated exposure control  IMAGE QUALITY:  Diagnostic  FINDINGS:  PARENCHYMA:  No intracranial mass, mass effect or midline shift  No CT signs of acute infarction  There is no parenchymal hemorrhage  VENTRICLES AND EXTRA-AXIAL SPACES:  Normal for patient's age  VISUALIZED ORBITS AND PARANASAL SINUSES:  Left maxillary and ethmoid sinusitis  Old left lamina papyracea fracture  CALVARIUM AND EXTRACRANIAL SOFT TISSUES:   Normal      Impression: No acute intracranial abnormality  Left maxillary and ethmoid sinusitis   Workstation performed: [id number]     Imaging Reports Reviewed by myself    Cultures:   Blood Culture: No results found for: BLOODCX  Urine Culture: No results found for: URINECX  Sputum Culture: No components found for: SPUTUMCX  Wound Culture: No results found for: WOUNDCULT    Last 24 Hours Medication List:     chlordiazePOXIDE 25 mg Oral Q6H [address]   divalproex sodium 500 mg Oral HS   enoxaparin 40 mg Subcutaneous Daily   folic acid IVPB 1 mg Intravenous Daily   haloperidol 5 mg Oral BID   nicotine 21 mg Transdermal Daily   thiamine (VITAMIN B1) 50 mL IVPB 100 mg Intravenous Q24H        Today, Patient Was Seen By: Dortia Higgins MD    ** Please Note: Dragon 360 Dictation voice to text software may have been used in the creation of this document   **

## 2017-10-10 VITALS
BODY MASS INDEX: 20.48 KG/M2 | OXYGEN SATURATION: 100 % | RESPIRATION RATE: 18 BRPM | HEIGHT: 67 IN | WEIGHT: 130.5 LBS | SYSTOLIC BLOOD PRESSURE: 123 MMHG | TEMPERATURE: 98.4 F | DIASTOLIC BLOOD PRESSURE: 79 MMHG | HEART RATE: 78 BPM

## 2017-10-10 PROBLEM — T51.2X1A: Status: RESOLVED | Noted: 2017-10-07 | Resolved: 2017-10-10

## 2017-10-10 LAB
ALBUMIN SERPL BCP-MCNC: 2.8 G/DL (ref 3.5–5)
ALP SERPL-CCNC: 67 U/L (ref 46–116)
ALT SERPL W P-5'-P-CCNC: 14 U/L (ref 12–78)
ANION GAP SERPL CALCULATED.3IONS-SCNC: 10 MMOL/L (ref 4–13)
AST SERPL W P-5'-P-CCNC: 7 U/L (ref 5–45)
ATRIAL RATE: 86 BPM
BILIRUB SERPL-MCNC: 0.3 MG/DL (ref 0.2–1)
BUN SERPL-MCNC: 7 MG/DL (ref 5–25)
CALCIUM SERPL-MCNC: 8.3 MG/DL (ref 8.3–10.1)
CHLORIDE SERPL-SCNC: 112 MMOL/L (ref 100–108)
CO2 SERPL-SCNC: 24 MMOL/L (ref 21–32)
CREAT SERPL-MCNC: 0.72 MG/DL (ref 0.6–1.3)
ERYTHROCYTE [DISTWIDTH] IN BLOOD BY AUTOMATED COUNT: 15.6 % (ref 11.6–15.1)
GFR SERPL CREATININE-BSD FRML MDRD: 114 ML/MIN/1.73SQ M
GLUCOSE P FAST SERPL-MCNC: 95 MG/DL (ref 65–99)
GLUCOSE SERPL-MCNC: 95 MG/DL (ref 65–140)
HCT VFR BLD AUTO: 36.1 % (ref 37–47)
HGB BLD-MCNC: 11.7 G/DL (ref 12–16)
MCH RBC QN AUTO: 28 PG (ref 27–31)
MCHC RBC AUTO-ENTMCNC: 32.3 G/DL (ref 31.4–37.4)
MCV RBC AUTO: 87 FL (ref 82–98)
P AXIS: 74 DEGREES
PLATELET # BLD AUTO: 181 THOUSANDS/UL (ref 130–400)
PMV BLD AUTO: 8.1 FL (ref 8.9–12.7)
POTASSIUM SERPL-SCNC: 3.5 MMOL/L (ref 3.5–5.3)
PR INTERVAL: 172 MS
PROT SERPL-MCNC: 5.5 G/DL (ref 6.4–8.2)
QRS AXIS: 24 DEGREES
QRSD INTERVAL: 78 MS
QT INTERVAL: 360 MS
QTC INTERVAL: 430 MS
RBC # BLD AUTO: 4.16 MILLION/UL (ref 4.2–5.4)
SODIUM SERPL-SCNC: 146 MMOL/L (ref 136–145)
T WAVE AXIS: 56 DEGREES
VENTRICULAR RATE: 86 BPM
WBC # BLD AUTO: 5.1 THOUSAND/UL (ref 4.8–10.8)

## 2017-10-10 PROCEDURE — 85027 COMPLETE CBC AUTOMATED: CPT | Performed by: STUDENT IN AN ORGANIZED HEALTH CARE EDUCATION/TRAINING PROGRAM

## 2017-10-10 PROCEDURE — 80053 COMPREHEN METABOLIC PANEL: CPT | Performed by: STUDENT IN AN ORGANIZED HEALTH CARE EDUCATION/TRAINING PROGRAM

## 2017-10-10 RX ORDER — NICOTINE 21 MG/24HR
1 PATCH, TRANSDERMAL 24 HOURS TRANSDERMAL DAILY
Qty: 28 PATCH | Refills: 0 | Status: SHIPPED | OUTPATIENT
Start: 2017-10-11

## 2017-10-10 RX ORDER — ACETAMINOPHEN 325 MG/1
650 TABLET ORAL EVERY 6 HOURS PRN
Qty: 30 TABLET | Refills: 0
Start: 2017-10-10

## 2017-10-10 RX ORDER — GABAPENTIN 100 MG/1
100 CAPSULE ORAL 3 TIMES DAILY
Qty: 90 CAPSULE | Refills: 0 | Status: SHIPPED | OUTPATIENT
Start: 2017-10-10

## 2017-10-10 RX ORDER — LANOLIN ALCOHOL/MO/W.PET/CERES
100 CREAM (GRAM) TOPICAL DAILY
Qty: 30 TABLET | Refills: 0 | Status: SHIPPED | OUTPATIENT
Start: 2017-10-11

## 2017-10-10 RX ORDER — DIVALPROEX SODIUM 500 MG/1
500 TABLET, EXTENDED RELEASE ORAL
Qty: 30 TABLET | Refills: 0 | Status: SHIPPED | OUTPATIENT
Start: 2017-10-10

## 2017-10-10 RX ORDER — THIAMINE MONONITRATE (VIT B1) 100 MG
100 TABLET ORAL DAILY
Status: DISCONTINUED | OUTPATIENT
Start: 2017-10-10 | End: 2017-10-11 | Stop reason: HOSPADM

## 2017-10-10 RX ORDER — FOLIC ACID 1 MG/1
1 TABLET ORAL DAILY
Status: DISCONTINUED | OUTPATIENT
Start: 2017-10-10 | End: 2017-10-11 | Stop reason: HOSPADM

## 2017-10-10 RX ORDER — CLONAZEPAM 0.5 MG/1
0.5 TABLET ORAL 3 TIMES DAILY PRN
Qty: 30 TABLET | Refills: 0 | Status: SHIPPED | OUTPATIENT
Start: 2017-10-10 | End: 2017-10-24

## 2017-10-10 RX ORDER — GABAPENTIN 100 MG/1
100 CAPSULE ORAL 3 TIMES DAILY
Status: DISCONTINUED | OUTPATIENT
Start: 2017-10-10 | End: 2017-10-11 | Stop reason: HOSPADM

## 2017-10-10 RX ORDER — FOLIC ACID 1 MG/1
1 TABLET ORAL DAILY
Qty: 30 TABLET | Refills: 0 | Status: SHIPPED | OUTPATIENT
Start: 2017-10-11

## 2017-10-10 RX ORDER — HALOPERIDOL 5 MG
5 TABLET ORAL 2 TIMES DAILY
Qty: 60 TABLET | Refills: 0 | Status: SHIPPED | OUTPATIENT
Start: 2017-10-10

## 2017-10-10 RX ORDER — ACETAMINOPHEN 325 MG/1
650 TABLET ORAL EVERY 6 HOURS PRN
Status: DISCONTINUED | OUTPATIENT
Start: 2017-10-10 | End: 2017-10-11 | Stop reason: HOSPADM

## 2017-10-10 RX ADMIN — HALOPERIDOL 5 MG: 5 TABLET ORAL at 19:02

## 2017-10-10 RX ADMIN — GABAPENTIN 100 MG: 100 CAPSULE ORAL at 12:46

## 2017-10-10 RX ADMIN — Medication 100 MG: at 09:06

## 2017-10-10 RX ADMIN — DIVALPROEX SODIUM 500 MG: 500 TABLET, EXTENDED RELEASE ORAL at 21:33

## 2017-10-10 RX ADMIN — LORAZEPAM 1 MG: 2 INJECTION INTRAMUSCULAR; INTRAVENOUS at 08:59

## 2017-10-10 RX ADMIN — GABAPENTIN 100 MG: 100 CAPSULE ORAL at 20:26

## 2017-10-10 RX ADMIN — ACETAMINOPHEN 650 MG: 325 TABLET, FILM COATED ORAL at 08:58

## 2017-10-10 RX ADMIN — ACETAMINOPHEN 650 MG: 325 TABLET, FILM COATED ORAL at 16:24

## 2017-10-10 RX ADMIN — LORAZEPAM 1 MG: 2 INJECTION INTRAMUSCULAR; INTRAVENOUS at 13:52

## 2017-10-10 RX ADMIN — GABAPENTIN 100 MG: 100 CAPSULE ORAL at 16:24

## 2017-10-10 RX ADMIN — NICOTINE 21 MG: 21 PATCH, EXTENDED RELEASE TRANSDERMAL at 09:15

## 2017-10-10 RX ADMIN — SODIUM CHLORIDE 125 ML/HR: 0.9 INJECTION, SOLUTION INTRAVENOUS at 04:54

## 2017-10-10 RX ADMIN — ENOXAPARIN SODIUM 40 MG: 40 INJECTION SUBCUTANEOUS at 09:03

## 2017-10-10 RX ADMIN — CHLORDIAZEPOXIDE HYDROCHLORIDE 25 MG: 25 CAPSULE ORAL at 06:06

## 2017-10-10 RX ADMIN — CHLORDIAZEPOXIDE HYDROCHLORIDE 25 MG: 25 CAPSULE ORAL at 00:14

## 2017-10-10 RX ADMIN — FOLIC ACID 1 MG: 1 TABLET ORAL at 09:06

## 2017-10-10 RX ADMIN — HALOPERIDOL 5 MG: 5 TABLET ORAL at 09:04

## 2017-10-10 NOTE — DISCHARGE SUMMARY
Discharge Summary - [place] [address] Internal Medicine    Patient Information: Davonte Phan 50 y o  female MRN: [id number]  Unit/Bed#: [address] 403-54 Encounter: [id number]    Discharging Physician / Practitioner: Hyun Hidalgo MD  PCP: No primary care provider on file  Admission Date: 10/7/2017  Discharge Date: 10/10/17    Reason for Admission: Altered Mental Status (son found pt around 21  altered, slow to respond  son found an empty rubbing alcohol next to pt )      Discharge Diagnoses:     Principal Problem:    Suicidal ideation  Active Problems:    Drug abuse    Recurrent major depressive disorder ([facility] [address] )  Resolved Problems:    Altered mental status    Isopropanol causing toxic effect, accidental or unintentional, initial encounter    Toxic metabolic encephalopathy      Consultations During Hospital Stay:  IP CONSULT TO PSYCHIATRY    Procedures Performed:     · none    Significant Findings:     · ABG: pg 7 3, CO@ 41 1, O2 88, HCO3 23 8  · Na on arrival 146  · Creatinine on arrival 1 32  · Troponin x1 negative  · Total CK 89  · Acetaminophen level <3  · Salicylate level 3 8  · Pregnancy test negative  · TSH 0 459  · UA negative for nitrites and leukocytes  · Serum ethanol negative  · Serum acetone positive 110  · Serum Isopropanol positive 134  · Serum alcohol <3  · Urine tox screen negative    Imaging while in hospital:    Xr Chest Portable  Result Date: 10/9/2017  Impression: No acute abnormality in the chest  Workstation performed: [id number]     Ct Head Without Contrast  Result Date: 10/7/2017  Impression: No acute intracranial abnormality  Left maxillary and ethmoid sinusitis  Workstation performed: [id number]       Incidental Findings:   · none     Test Results Pending at Discharge (will require follow up):   · As per After Visit Summary     Outpatient Tests Requested:  · none    Complications:  none    Hospital Course:      Davonte Phan is a 50 y o  female patient with a PMH of alcohol, heroin and cocaine abuse who originally presented to the hospital on 10/7/2017 due to altered mental status  She was found unconsious by her son with a bottle of half filled rubbing alcohol next to her  Per family she moved in with her son 2 months ago and has been drinking excessively  Amount was hard to quantify but it was daily  Her son refused to buy her more etoh and then the same day found her down  He suspected she drank rubbing alcohol  In the ED she was lethargic  Labs were pertinent for elevated sodium, and creatinine  She was admitted for drug overdose  The following medical conditions were addressed during hospitalization:    · Isopropanol overdose, possibly intentional: patient was positive for serum isopropanol and acetone  She had a normal anion gap  Clinically she displayed lethargy and kussmaul breathing  Poison control was called and recommended conservative treatment with IV hydration, and close monitoring of electrolytes and creatinine  She had neuro checks every 4 hours  After several days her mentation improved  Her could small breathing resolved  She was seen by Psychiatry and admitted to suicidal ideation  She agreed to inpatient psychiatry and was transferred to a facility once she was medically stable  · RENE secondary to isopropyl mild overdose  She which she is conservatively with IV fluids and her creatinine normalized  · History of alcohol abuse with withdrawal   Once patient's mentation began to improve she began to be extremely anxious, tachycardic  She was started on Librium protocol and Ativan  She was seen by Psychiatry  · Depression with suicidal ideation, mixed bipolar type, anxiety, adjustment disorder with emotional features:  Psych was consulted to follow the patient  She was started on Haldol 5 mg b i d , Depakote 500 mg q h s , and p r n  Ativan IV which was transitioned to PO Klonopin p r n  on discharge   She was transferred to inpatient psych facility once medically cleared  · Chronic pain with a history of heroin and cocaine abuse  Patient had generalized pain throughout her hospitalization  She was denied narcotics because of her history  She was started on gabapentin on discharge and will need pain management as an outpatient  · Nicotine dependence  She was started on nicotine patch and advised on smoking cessation  Condition at Discharge: stable     Discharge Day Visit / Exam:     Subjective:  Bandar Funez says shes very depressed  She is also in a lot of pain "all over"  She cries easily and her mood is labile  She denies being suicidal "right now" but says she does "want it all to end"  She is still asking for more benzos or to be started on narcotics  She is agreeable to inpatient psych  Vitals: Blood Pressure: 123/79 (10/10/17 0841)  Pulse: 78 (10/10/17 0841)  Temperature: 98 4 °F (36 9 °C) (10/10/17 0858)  Temp Source: Oral (10/10/17 0858)  Respirations: 18 (10/10/17 0841)  Height: 5' 7" (170 2 cm) (10/07/17 1433)  Weight - Scale: 59 2 kg (130 lb 8 oz) (10/07/17 1433)  SpO2: 100 % (10/10/17 0841)  Exam:   Physical Exam   Constitutional: She is oriented to person, place, and time  She appears well-developed  No distress  HENT:   Head: Normocephalic and atraumatic  Eyes: No scleral icterus  Right pupil 6mm and fixed from past injury, left pupil 3mm and reactive  Neck: Neck supple  No JVD present  Cardiovascular: Normal rate, regular rhythm and normal heart sounds  No murmur heard  Pulmonary/Chest: Effort normal and breath sounds normal  No respiratory distress  She has no wheezes  She has no rales  Abdominal: Soft  Bowel sounds are normal  She exhibits no distension  There is no tenderness  There is no rebound  Musculoskeletal: She exhibits no edema or tenderness  Neurological: She is alert and oriented to person, place, and time  She exhibits normal muscle tone  Coordination normal    Skin: Skin is warm and dry  She is not diaphoretic  Psychiatric: Her mood appears anxious  Her affect is labile  She expresses impulsivity  She exhibits a depressed mood  She is inattentive  Discharge instructions/Information to patient and family:(Discharge Medications and Follow up):   See after visit summary for information provided to patient and family  Provisions for Follow-Up Care:  See after visit summary for information related to follow-up care and any pertinent home health orders  Disposition: Inpatient psychiatry: [facility]    Planned Readmission:  No     Discharge Statement:  I spent >30 minutes discharging the patient  This time was spent on the day of discharge  I had direct contact with the patient on the day of discharge  Greater than 50% of the total time was spent examining patient, answering all patient questions, arranging and discussing plan of care with patient as well as directly providing post-discharge instructions  Additional time then spent on discharge activities  Discharge Medications:  See after visit summary for reconciled discharge medications provided to patient and family  ** Please Note: Dragon 360 Dictation voice to text software may have been used in the creation of this document   **

## 2017-10-10 NOTE — CASE MANAGEMENT
Continued Stay Review  OBSERVATION STATUS CONTINUE STAY    Date: 10/9/17    Vital Signs: /79   Pulse 78   Temp 98 °F (36 7 °C) (Oral)   Resp 18   Ht 5' 7" (1 702 m)   Wt 59 2 kg (130 lb 8 oz)   SpO2 100%   BMI 20 44 kg/m²     Medications:   Scheduled Meds:   divalproex sodium 500 mg Oral HS   enoxaparin 40 mg Subcutaneous Daily   folic acid 1 mg Oral Daily   haloperidol 5 mg Oral BID   nicotine 21 mg Transdermal Daily   thiamine 100 mg Oral Daily     Continuous Infusions:    PRN Meds:   acetaminophen    LORazepam    Abnormal Labs/Diagnostic Results:     Age/Sex: 50 y o  female     PER MD  Assessment/Plan:   Principal Problem:    Suicidal ideation  Active Problems:    Isopropanol causing toxic effect, accidental or unintentional, initial encounter    Drug abuse    Recurrent major depressive disorder (HCC)  Assessment & Plan:  · Toxic metabolic encephalopathy resolved  ·  RENE- resolved  DC IVF hydration  Patient tolerating PO  · IsoPropanol overdose  Treated conservatively with IV fluids  · Depression/anxiety with Suicidal ideation  cleared for inpatient psych  Crisis called and will come evaluate the patient  Ativan PRN anxiety  · H/o Etoh abuse- on librium protocol for WD, will DC in AM, ativan PRN   · H/o drug abuse, including cocaine and heroin  · Chronic pain- avoid narcotics 2/2 the above  · Nicotine dependence- nicotine patch    PER PES CRISIS   Pt's physician called PES about 16:10 to report the pt was medically cleared  PES spoke to pt who agree's to a voluntary inpt psychiatric admission  Chart reviews (unable top print ekg); chest xray needs to be ordered - spoke with pt's RN - Arnol Nam about 16:30 to address these 2 areas  Spoke with [facility] - they are willing to review the case but want the complete chart sent only once    Called radiology reading room [phone] to determine why chest xray was not read - was ordered as "rountine" instead of "stat" - they will have chest xray read for PES shortly  Chart faxed to [facility] @ 20:30 - called to verify receipt and spoke with Selvin Spine  Chart will be reviewed in the morning; pt's Rn advised and asked to inform pt  Spoke with Selvin Ziegler from [facility] about 22:45 - the have the complete chart and will review in am tomorrow

## 2017-10-10 NOTE — PROGRESS NOTES
10/10/17 @ 0900:  PES called [facility] @ [phone] for update:  Spoke to Aman, who will call Perez to follow up and call back asap  MS Mervat  0930: Received call from Teressa at [facility]; will call back  MS Mervat  0945: Called [facility]:  Teressa not available; will call back  [place], [place]    1017: Received return call from Teressa:  Psychiatrist wants to know if:  1  Patient is currently suicidal         2  Any history of violence         3  If family will take her back home         4  If she's able to sign consent  PES will talk to patient and provide information to [facility]  Hank Stapleton, [address], Dr: PES provided information to Aman; she will contact Psychiatrist at [facility] and fax a voluntary consent form, which PES will have patient endorse and fax to [facility]  [place], [place]  1130: Received voluntary consent; patient to sign then fax to:  [phone]  [place], [place]  1155: Faxed vol consent to [facility]  [place]at, [place]  1230: Patient accepted by Dr Magy Rodriges at [address] [place] [address]; PES left original consent with ; provided RN to RN information:  [phone]; will set up transport  Mervat [place]  1250: Called [facility] and spoke to Rosalind, who reported that he needed to check some things as they are "a truck down," and asked PES to call back after 1400  Hank Stapleton, [address]: Called [facility]; as per Rosalind,  time is 2000; will inform RN and Erick  Mervat, [place]  1400: Called RN and [facility] to notify ETA  PES reminded RN to sent original voluntary consent with patient, and she will pass on to next shift    Hank Stapleton MS

## 2017-10-10 NOTE — [place] UNIVERSAL TRANSFER FORM
[place] UNIV[place] TRANSFER FORM  (ALL ITEMS MUST BE COMPLETED)    1  TRANSFER FROM: [address] [place] [address] Street: [facility]     2  DATE OF TRANSFER: 10/10/2017                        TIME OF TRANSFER: 2000    3  PATIENT NAME: Beverley [place]cala,        YOB: 1970                             GENDER: female    4  LANGUAGE:   English    5  PHYSICIAN NAME:  Ulisses Echols MD                   PHONE: [phone]  CODE STATUS: Level 1 - Full Code        Out of Hospital DNR Attached: no    7  :                                      :  Extended Emergency Contact Information  Primary Emergency Contact: Wilmer Sarah  Address: [address] [place] [address] Phone: [phone]  Relation: [address] Representative/Proxy:  No             Legal Guardian:  no             NAME OF:           HEALTH CARE REPRESENTATIVE/PROXY:                                         OR           LEGAL GUARDIAN, IF NOT :                                               PHONE:  (Day)           (Night)                        (Cell)    8  REASON FOR TRANSFER: (Must include brief medical history and recent changes in physical function or cognition ) inpatient psych            V/S: /79   Pulse 78   Temp 98 4 °F (36 9 °C) (Oral)   Resp 18   Ht 5' 7" (1 702 m)   Wt 59 2 kg (130 lb 8 oz)   SpO2 100%   BMI 20 44 kg/m²           PAIN: no    9  PRIMARY DIAGNOSIS: Suicidal ideation      Secondary Diagnosis:         Pacemaker: no      Internal Defib:no          Mental Health Diagnosis (if Applicable):    10  RESTRAINTS: none     11  RESPIRATORY NEEDS: none    12  ISOLATION/PRECAUTION:none    13  ALLERGY: Review of patient's allergies indicates no known allergies  14  SENSORY: intact      15  SKIN CONDITION: intact  16  DIET: regular    17  IV ACCESS:none    18   PERSONAL ITEMS SENT WITH PATIENT: None     19  ATTACHED DOCUMENTS: MUST ATTACH CURRENT MEDICATION INFORMATION after visit summary, medications    20  AT RISK ALERTS:suicide        HARM TO: self    21  WEIGHT BEARING STATUS:         Left Leg: full        Right Leg:full    22  MENTAL STATUS :AOx3    23  FUNCTION:        Walk: self        Transfer:self        Toilet: self        Feed:self    24  IMMUNIZATIONS/SCREENING:     There is no immunization history on file for this patient  25  BOWEL: continent    26  BLADDER:continent    27   SENDING FACILITY CONTACT: Ty                  Title: RN         Unit:[address]        Phone:[phone]        REC'G FACILITY CONTACT (if known):        Title:        Unit:         Phone:         FORM PREFILLED BY (if applicable)       Title:       Unit:        Phone:         FORM COMPLETED BY Raffi Joiner RN      Title: ANGEL      Phone: [phone]

## 2017-10-10 NOTE — PROGRESS NOTES
Patient examined spoke to [address] patient is medically clear crisis worker Adán Sanchez spoke with the patient and processing a transferred to inpatient psychiatric care patient is a voluntary patient is able to communicate her feelings well and she admits being depressed and she needs help  Patient was also not taking her medications Haldol and lithium for more than a year and she deteriorated with her depression now she is willing to go to inpatient psychiatric care and take her medications  Currently she is taking her medication Haldol and Depakote as ordered she is also on Ativan p r n     I discuss with Dr Kd Rojas    will change Ativan p r n  to Klonopin p r n  for anxiety patient is waiting for a transfer to inpatient psychiatric care for further treatment and stabilization  Patient was able to communicate her feelings well and she is cooperative  Patient remains severely depressed and unpredictable I will continue one-to-one suicidal precaution until patient is being transferred to inpatient psychiatric care  Thank you very much

## 2017-10-11 NOTE — PROGRESS NOTES
Order to discharge patient to [facility]  Verified  Discharge instructions reviewed with patient  Patient verbalized understanding  Report given to [facility] at 2033  IV discontinued  Patient left hospital with [facility] with all his belonging and prescriptions  To [facility] at 2145

## 2017-10-24 ENCOUNTER — APPOINTMENT (EMERGENCY)
Dept: RADIOLOGY | Facility: HOSPITAL | Age: 47
End: 2017-10-24
Payer: MEDICAID

## 2017-10-24 ENCOUNTER — HOSPITAL ENCOUNTER (EMERGENCY)
Facility: HOSPITAL | Age: 47
End: 2017-10-24
Attending: EMERGENCY MEDICINE | Admitting: EMERGENCY MEDICINE
Payer: MEDICAID

## 2017-10-24 VITALS
OXYGEN SATURATION: 98 % | BODY MASS INDEX: 22.49 KG/M2 | HEIGHT: 65 IN | SYSTOLIC BLOOD PRESSURE: 142 MMHG | TEMPERATURE: 97.5 F | DIASTOLIC BLOOD PRESSURE: 60 MMHG | HEART RATE: 72 BPM | RESPIRATION RATE: 16 BRPM | WEIGHT: 135 LBS

## 2017-10-24 DIAGNOSIS — T50.901A OVERDOSE: Primary | ICD-10-CM

## 2017-10-24 DIAGNOSIS — T14.91XA SUICIDAL BEHAVIOR WITH ATTEMPTED SELF-INJURY (HCC): ICD-10-CM

## 2017-10-24 DIAGNOSIS — F32.A DEPRESSION: ICD-10-CM

## 2017-10-24 LAB
ACETONE SERPL-MCNC: 71 MG/DL
ACETONE SERPL-MCNC: POSITIVE MG/DL
ALBUMIN SERPL BCP-MCNC: 3.6 G/DL (ref 3.5–5)
ALP SERPL-CCNC: 63 U/L (ref 46–116)
ALT SERPL W P-5'-P-CCNC: 63 U/L (ref 12–78)
AMPHETAMINES SERPL QL SCN: NEGATIVE
ANION GAP SERPL CALCULATED.3IONS-SCNC: 8 MMOL/L (ref 4–13)
APAP SERPL-MCNC: <2 UG/ML (ref 10–30)
AST SERPL W P-5'-P-CCNC: 22 U/L (ref 5–45)
BACTERIA UR QL AUTO: ABNORMAL /HPF
BARBITURATES UR QL: NEGATIVE
BASOPHILS # BLD AUTO: 0 THOUSANDS/ΜL (ref 0–0.1)
BASOPHILS NFR BLD AUTO: 0 % (ref 0–1)
BENZODIAZ UR QL: POSITIVE
BILIRUB SERPL-MCNC: 0.2 MG/DL (ref 0.2–1)
BILIRUB UR QL STRIP: NEGATIVE
BUN SERPL-MCNC: 4 MG/DL (ref 5–25)
CALCIUM SERPL-MCNC: 9 MG/DL (ref 8.3–10.1)
CHLORIDE SERPL-SCNC: 110 MMOL/L (ref 100–108)
CLARITY UR: CLEAR
CO2 SERPL-SCNC: 28 MMOL/L (ref 21–32)
COCAINE UR QL: POSITIVE
COLOR UR: ABNORMAL
CREAT SERPL-MCNC: 1.22 MG/DL (ref 0.6–1.3)
EOSINOPHIL # BLD AUTO: 0.1 THOUSAND/ΜL (ref 0–0.61)
EOSINOPHIL NFR BLD AUTO: 1 % (ref 0–6)
ERYTHROCYTE [DISTWIDTH] IN BLOOD BY AUTOMATED COUNT: 16 % (ref 11.6–15.1)
ETHANOL SERPL-MCNC: <3 MG/DL (ref 0–3)
ETHANOL SERPL-MSCNC: NEGATIVE
EXT PREG TEST URINE: NEGATIVE
GFR SERPL CREATININE-BSD FRML MDRD: 60 ML/MIN/1.73SQ M
GLUCOSE SERPL-MCNC: 119 MG/DL (ref 65–140)
GLUCOSE UR STRIP-MCNC: NEGATIVE MG/DL
HCT VFR BLD AUTO: 39 % (ref 37–47)
HGB BLD-MCNC: 12.6 G/DL (ref 12–16)
HGB UR QL STRIP.AUTO: ABNORMAL
HYALINE CASTS #/AREA URNS LPF: ABNORMAL /LPF
ISOPROPANOL BLD-MCNC: 20 MG/DL
ISOPROPANOL SPEC-SCNC: POSITIVE MMOL/L
KETONES UR STRIP-MCNC: ABNORMAL MG/DL
LEUKOCYTE ESTERASE UR QL STRIP: NEGATIVE
LIPASE SERPL-CCNC: 92 U/L (ref 73–393)
LYMPHOCYTES # BLD AUTO: 1.3 THOUSANDS/ΜL (ref 0.6–4.47)
LYMPHOCYTES NFR BLD AUTO: 14 % (ref 14–44)
MAGNESIUM SERPL-MCNC: 2.1 MG/DL (ref 1.6–2.6)
MCH RBC QN AUTO: 28.3 PG (ref 27–31)
MCHC RBC AUTO-ENTMCNC: 32.4 G/DL (ref 31.4–37.4)
MCV RBC AUTO: 87 FL (ref 82–98)
METHADONE UR QL: NEGATIVE
METHANOL SERPL-MCNC: NEGATIVE MG/DL
MONOCYTES # BLD AUTO: 0.3 THOUSAND/ΜL (ref 0.17–1.22)
MONOCYTES NFR BLD AUTO: 4 % (ref 4–12)
MUCOUS THREADS UR QL AUTO: ABNORMAL
NEUTROPHILS # BLD AUTO: 7.3 THOUSANDS/ΜL (ref 1.85–7.62)
NEUTS SEG NFR BLD AUTO: 81 % (ref 43–75)
NITRITE UR QL STRIP: NEGATIVE
NON-SQ EPI CELLS URNS QL MICRO: ABNORMAL /HPF
NRBC BLD AUTO-RTO: 0 /100 WBCS
OPIATES UR QL SCN: POSITIVE
PCP UR QL: NEGATIVE
PH UR STRIP.AUTO: 8 [PH] (ref 5–9)
PLATELET # BLD AUTO: 322 THOUSANDS/UL (ref 130–400)
PMV BLD AUTO: 7.6 FL (ref 8.9–12.7)
POTASSIUM SERPL-SCNC: 3.4 MMOL/L (ref 3.5–5.3)
PROT SERPL-MCNC: 6.8 G/DL (ref 6.4–8.2)
PROT UR STRIP-MCNC: NEGATIVE MG/DL
RBC # BLD AUTO: 4.46 MILLION/UL (ref 4.2–5.4)
RBC #/AREA URNS AUTO: ABNORMAL /HPF
SALICYLATES SERPL-MCNC: <3 MG/DL (ref 3–20)
SODIUM SERPL-SCNC: 146 MMOL/L (ref 136–145)
SP GR UR STRIP.AUTO: 1.01 (ref 1–1.03)
THC UR QL: NEGATIVE
UROBILINOGEN UR QL STRIP.AUTO: 0.2 E.U./DL
VALPROATE SERPL-MCNC: <3 UG/ML (ref 50–100)
WBC # BLD AUTO: 8.9 THOUSAND/UL (ref 4.8–10.8)
WBC #/AREA URNS AUTO: ABNORMAL /HPF

## 2017-10-24 PROCEDURE — 99285 EMERGENCY DEPT VISIT HI MDM: CPT

## 2017-10-24 PROCEDURE — 80307 DRUG TEST PRSMV CHEM ANLYZR: CPT | Performed by: EMERGENCY MEDICINE

## 2017-10-24 PROCEDURE — 71010 HB CHEST X-RAY 1 VIEW FRONTAL (PORTABLE): CPT

## 2017-10-24 PROCEDURE — 83735 ASSAY OF MAGNESIUM: CPT | Performed by: EMERGENCY MEDICINE

## 2017-10-24 PROCEDURE — 96360 HYDRATION IV INFUSION INIT: CPT

## 2017-10-24 PROCEDURE — 85025 COMPLETE CBC W/AUTO DIFF WBC: CPT | Performed by: EMERGENCY MEDICINE

## 2017-10-24 PROCEDURE — 93005 ELECTROCARDIOGRAM TRACING: CPT | Performed by: EMERGENCY MEDICINE

## 2017-10-24 PROCEDURE — 80053 COMPREHEN METABOLIC PANEL: CPT | Performed by: EMERGENCY MEDICINE

## 2017-10-24 PROCEDURE — 80164 ASSAY DIPROPYLACETIC ACD TOT: CPT | Performed by: EMERGENCY MEDICINE

## 2017-10-24 PROCEDURE — 83690 ASSAY OF LIPASE: CPT | Performed by: EMERGENCY MEDICINE

## 2017-10-24 PROCEDURE — 81001 URINALYSIS AUTO W/SCOPE: CPT | Performed by: EMERGENCY MEDICINE

## 2017-10-24 PROCEDURE — 80329 ANALGESICS NON-OPIOID 1 OR 2: CPT | Performed by: EMERGENCY MEDICINE

## 2017-10-24 PROCEDURE — 81025 URINE PREGNANCY TEST: CPT | Performed by: EMERGENCY MEDICINE

## 2017-10-24 PROCEDURE — 36415 COLL VENOUS BLD VENIPUNCTURE: CPT | Performed by: EMERGENCY MEDICINE

## 2017-10-24 PROCEDURE — 74000 HB X-RAY EXAM OF ABDOMEN (SINGLE ANTEROPOSTERIOR VIEW): CPT

## 2017-10-24 PROCEDURE — 80320 DRUG SCREEN QUANTALCOHOLS: CPT | Performed by: EMERGENCY MEDICINE

## 2017-10-24 RX ORDER — ACETAMINOPHEN 325 MG/1
975 TABLET ORAL ONCE
Status: COMPLETED | OUTPATIENT
Start: 2017-10-24 | End: 2017-10-24

## 2017-10-24 RX ORDER — CHLORDIAZEPOXIDE HYDROCHLORIDE 25 MG/1
50 CAPSULE, GELATIN COATED ORAL EVERY 6 HOURS SCHEDULED
Status: DISCONTINUED | OUTPATIENT
Start: 2017-10-24 | End: 2017-10-25 | Stop reason: HOSPADM

## 2017-10-24 RX ORDER — OLANZAPINE 5 MG/1
5 TABLET, ORALLY DISINTEGRATING ORAL
Status: DISCONTINUED | OUTPATIENT
Start: 2017-10-24 | End: 2017-10-24

## 2017-10-24 RX ORDER — NICOTINE 21 MG/24HR
21 PATCH, TRANSDERMAL 24 HOURS TRANSDERMAL ONCE
Status: DISCONTINUED | OUTPATIENT
Start: 2017-10-24 | End: 2017-10-25 | Stop reason: HOSPADM

## 2017-10-24 RX ORDER — OLANZAPINE 5 MG/1
5 TABLET, ORALLY DISINTEGRATING ORAL
Status: DISCONTINUED | OUTPATIENT
Start: 2017-10-24 | End: 2017-10-25 | Stop reason: HOSPADM

## 2017-10-24 RX ADMIN — CHLORDIAZEPOXIDE HYDROCHLORIDE 50 MG: 25 CAPSULE ORAL at 23:50

## 2017-10-24 RX ADMIN — SODIUM CHLORIDE 1000 ML: 0.9 INJECTION, SOLUTION INTRAVENOUS at 10:07

## 2017-10-24 RX ADMIN — CHLORDIAZEPOXIDE HYDROCHLORIDE 50 MG: 25 CAPSULE ORAL at 18:23

## 2017-10-24 RX ADMIN — CHLORDIAZEPOXIDE HYDROCHLORIDE 50 MG: 25 CAPSULE ORAL at 14:08

## 2017-10-24 RX ADMIN — OLANZAPINE 5 MG: 5 TABLET, ORALLY DISINTEGRATING ORAL at 18:23

## 2017-10-24 RX ADMIN — ACETAMINOPHEN 975 MG: 325 TABLET, FILM COATED ORAL at 11:20

## 2017-10-24 RX ADMIN — NICOTINE 21 MG: 21 PATCH, EXTENDED RELEASE TRANSDERMAL at 18:54

## 2017-10-24 NOTE — ED NOTES
Pt asking again for pain medication, Dr Rodgers Back aware, already spoke with pt earlier, will not give any pain medication other than tylenol     John De Leon RN  10/24/17 8900

## 2017-10-24 NOTE — ED NOTES
Assumed care of patient  Patient is brought back to room 20  Lunch tray ordered        Eric Roblero, RN  10/24/17 2049

## 2017-10-24 NOTE — ED CARE HANDOFF
5:00 PM  Patient is medically clear for psychiatric evaluation  8:18 PM spoke with poison control at this time, medically cleared at this time  Stable for psych management

## 2017-10-24 NOTE — DISCHARGE INSTRUCTIONS
Adult Overdose   WHAT YOU NEED TO KNOW:   An overdose occurs when you take more medicine than is safe to take  An overdose may be mild, or it may be a life-threatening emergency  You may feel drowsy, dizzy, or nauseated, depending on what medicine you took  No specific harm was found to your body as a result of your overdose  Your symptoms have decreased over the last 6 to 12 hours  DISCHARGE INSTRUCTIONS:   Call 911 if you or someone close to you has any of the following symptoms:   · Your face is very pale and clammy to the touch  · Your body is limp or you are unable to speak  · You cannot be awakened  · Your breathing is slower or faster than usual      · Your heart is beating slower than usual     · You feel confused or more tired than usual, or you are sweating more than normal     · Your speech is slurred  · Your fingernails or lips are blue or purple  Return to the emergency department if:   · You have severe nausea and vomiting  · You cannot have a bowel movement or urinate  · Your skin and the whites of your eyes turn yellow  Contact your healthcare provider if:   · You think your medicine is not working  · You have nausea, vomiting, diarrhea, or abdominal cramps  · You have questions or concerns about your medicine  Take your medicine as directed:  Contact your healthcare provider if you think your medicine is not helping or if you have side effects  Do not take more medicine that is prescribed  Keep your medicines in the original containers  Keep a list of the medicines, vitamins, and herbs you take  Include the amounts, and when and why you take them  Do not share your medicine with others  Prevent another overdose:   · Read labels carefully  Read the labels of all the medicines that you take  Never take more than the label says to take  If you have questions, ask your pharmacist or healthcare provider  · Do not drink alcohol    Alcohol increases your risk for another overdose  Alcohol can also hide important symptoms that you need to call your healthcare provider for  · Do not drive or operate machinery  until your healthcare provider says it is okay  These activities may be dangerous after an overdose  · Use caution if you take more than one medicine at a time  Mixing medicines or taking more than one medicine at a time can be dangerous  · Tell your family or friends what medicines you are taking  Talk with them about what to do if you have an overdose  Follow up with your healthcare provider as directed: You may need to see a counselor or psychiatrist  Write down your questions so you remember to ask them during your visits  © 2017 [address]  Information is for End User's use only and may not be sold, redistributed or otherwise used for commercial purposes  All illustrations and images included in CareNotes® are the copyrighted property of A D A M , Inc  or P[employer]ual Barajas  The above information is an  only  It is not intended as medical advice for individual conditions or treatments  Talk to your doctor, nurse or pharmacist before following any medical regimen to see if it is safe and effective for you  Depression, Ambulatory Care   GENERAL INFORMATION:   Depression  is a medical condition that causes feelings of sadness or hopelessness that do not go away  Depression may cause you to lose interest in things you used to enjoy  These feelings may interfere with your daily life    Common symptoms include the following:   · Appetite changes, or weight gain or loss    · Trouble going to sleep or staying asleep, or sleeping too much    · Fatigue or lack of energy    · Feeling restless, irritable, or withdrawn    · Feeling worthless, hopeless, discouraged, or very guilty    · Trouble concentrating, remembering things, doing daily tasks, or making decisions    · Thoughts about hurting or killing yourself  Seek immediate care for the following symptoms:   · You think about harming yourself or someone else  Treatment for depression  may include medicine to improve or balance your mood  Therapy may also be used to treat your depression  A therapist will help you learn to cope with your thoughts and feelings  Therapy can be done alone or in a group  It may also be done with family members or a significant other  Manage depression:   · Get regular physical activity  Try to exercise for 30 minutes, 3 to 5 days a week  Work with your healthcare provider to develop an exercise plan that you enjoy  · Get enough sleep  Create a routine to help you relax before bed  Try to go to bed and wake up at the same time every day  Sleep is important for emotional health  · Eat a variety of healthy foods  Healthy foods include fruits, vegetables, whole-grain breads, low-fat dairy products, beans, lean meats, and fish  A healthy meal plan is low in fat, salt, and added sugar  · Avoid or limit alcohol  Ask your healthcare provider how much alcohol is safe for you to drink  A drink of alcohol is 12 ounces of beer, 5 ounces of wine, or 1½ ounces of liquor  Follow up with your healthcare provider as directed: You will need to return so your healthcare provider can monitor your progress  Write down your questions so you remember to ask them during your visits  CARE AGREEMENT:   You have the right to help plan your care  Learn about your health condition and how it may be treated  Discuss treatment options with your caregivers to decide what care you want to receive  You always have the right to refuse treatment  The above information is an  only  It is not intended as medical advice for individual conditions or treatments  Talk to your doctor, nurse or pharmacist before following any medical regimen to see if it is safe and effective for you    © 2014 [address] is for End User's use only and may not be sold, redistributed or otherwise used for commercial purposes  All illustrations and images included in CareNotes® are the copyrighted property of A D A M , Inc  or Pascual Barajas  Suicide Prevention for Adults   WHAT YOU NEED TO KNOW:   What do I need to know about suicide prevention? A person may see suicide as the only way to escape emotional or physical pain and suffering  You can help provide emotional support for him or her and get the help he or she needs  Learn to recognize warning signs that the person may be considering suicide  Find resources to help prevent him or her from attempting to take his or her life  What should I do if I think the person is considering suicide? Call 911 if you feel the person is at immediate risk of suicide, or if he or she talks about an active suicide plan  Assume that the person intends to carry out his or her plan  Resources are available to help you and the person  The following are some things you can do:  · Call the [address] [place] [address] at 7-165-589TALK ([phone])   · Call the Suicide Hotline at 6-418-HMFRIEA (1-974.910.8410)   · Contact the person's therapist  His or her healthcare provider can give you a list of therapists if he or she does not have one  · Keep medicines, weapons, and alcohol out of the person's reach  Do not leave the person alone if he or she says they want to commit suicide  Do not leave the person alone if you think he or she may try it  Make sure you do not put yourself at risk if the person has a weapon  · Do not be afraid to ask if the person is thinking of ending his or her life  Ask if the person has a plan for hurting or killing himself or herself  What warning signs should I watch for?    · Talking about a plan for committing suicide, or suddenly deciding to make a will    · Cutting himself or herself, burning the skin with cigarettes, or driving recklessly    · Drug or alcohol use, not taking prescribed medicine, or taking too much prescribed medicine    · Sudden anger, lashing out at others, or seeming hopeless, anxious, or angry and then suddenly becoming happy or peaceful    · Not wanting to spend time with others or doing things he or she usually enjoys    · Trouble at work, or not showing up for work    · A change in the way he or she eats, sleeps, or dresses    · Weight gain or loss or having less energy than usual    · Trouble sleeping or spending a lot of time sleeping    · Giving away or throwing away his belongings  What increases the risk for suicide? · Depression or chronic sleep disorders such as insomnia    · Alcohol or drug use    · Death of an important person, or the anniversary of that person's death    · A past suicide attempt, or someone close to him or her attempted or committed suicide    · Mental illness, such as schizophrenia, bipolar disorder, or posttraumatic stress disorder (PTSD)     · Chronic pain, or a serious illness, such as heart disease, cancer, or AIDS    · Being physically dependent on others    · Mental, physical, or sexual abuse    · A history of violence or aggression toward others, or feeling guilty for hurting someone else    · Stress from divorce or a breakup, or loss of a friendship, or loneliness    · Struggling with being altamirano, lesbian, or bisexual    · Stress from the loss of a job, or a stressful job  How will healthcare providers help the person? · Healthcare providers will ask questions about the person's suicide thoughts and plans  They will ask how often he or she thinks about suicide and if he or she has tried it before  They will ask if he or she hurt himself, such as with cutting or reckless driving  They may ask if he or she has access to weapons or drugs  · A healthcare provider will help the person create a safety plan  The plan includes a list of people or groups to contact if he or she has suicidal thoughts again   The list may include friends, family members, a spiritual leader, and others he or she trusts  The person may be asked to make a verbal agreement or sign a contract that he or she will not try to harm himself  What treatment may the person need? · Medicines  may be given to prevent mood swings, or to decrease anxiety or depression  The person will need to take all medicines as directed  A sudden stop can be harmful  It may take 4 to 6 weeks for the medicine to help him or her feel better  · A therapist  can help the person identify and change negative feelings or beliefs about himself or herself  This may also help change the way the he or she feels and acts  A therapist can also help the person find ways to cope with things that cannot be changed  What can I do to help the person? · Encourage the person to seek help for drug or alcohol abuse  Drugs and alcohol can increase suicidal thoughts and make the person more likely to act on them  · Help the person connect with others  Encourage him or her to become involved in the community  Some examples include tutoring a young student, volunteering at a [employer], or joining a group exercise program  The person may need help setting up a computer or creating an e-mail account to help him or her remain connected to others  · Exercise with the person  Exercise can lift his or her mood, increase energy, and make it easier to sleep at night  · Encourage the person to try new things  Adults who are open to new experiences handle stress and change better than those who are not  · Call, visit, or send postcards to the person often  Check on him or her after the loss of a pet, longtime friend, or child  Holidays, birthdays, and anniversaries can be difficult for a person after a loss  The loss of a spouse can be especially painful and lonely  · Help the person schedule a visit with his or her Sabianist or spiritual leader    A Sabianist or spiritual leader may be able to offer additional support and resources to the person  · Help the person get equipment that will increase his or her comfort and mobility  Examples are hearing aids, glasses, large print books, and walkers  These can help him or her enjoy activities and feel more independent  · Encourage the person to continue taking medicine and going to therapy  Medicine and therapy can help improve his or her mental health  Where can I find support and more information? · [facility] , [address] [place] [address]  Phone: 8- 804 - 121-SAXM (01 [phone] 04 02)  Web Address: [employer]  · Suicide Awareness Voices of Education  [address] , [address]  Phone: 3- 934 - 436-7580  Web Address: [employer]  org  Call 413 if:   · The person has done something on purpose to hurt himself or herself  · The person tries to commit suicide  When should I seek immediate care? · The person tells you he or she made a plan to commit suicide  · The person acts out in anger, is reckless, or is abusing alcohol or drugs  · The person has serious thoughts of suicide, even with treatment  When should I contact the person's healthcare provider or therapist?   · You begin to see warning signs that the person may be considering suicide  · The person has intense feelings of sadness, anger, revenge, or despair, or he cannot make decisions easily  · The person tells you he or she has more thoughts of suicide when they are alone  · The person withdraws from others  · The person stops eating, or begins to smoke or drink heavily  · The person feels he or she is a burden because of a disability or disease  · The person has trouble dealing with stress, such as a breakup or a job loss  · You have questions or concerns about the person's condition or care  CARE AGREEMENT:   You have the right to help plan your care   Learn about your health condition and how it may be treated  Discuss treatment options with your caregivers to decide what care you want to receive  You always have the right to refuse treatment  The above information is an  only  It is not intended as medical advice for individual conditions or treatments  Talk to your doctor, nurse or pharmacist before following any medical regimen to see if it is safe and effective for you  © 2017 [address] Information is for End User's use only and may not be sold, redistributed or otherwise used for commercial purposes  All illustrations and images included in CareNotes® are the copyrighted property of A D A M , Inc  or Pascual Barajas

## 2017-10-24 NOTE — ED NOTES
Pt awake, alert, states she is still in pain, "Tylenol is not going to work"     Jewel Márquez, ANGEL  10/24/17 9752

## 2017-10-25 LAB
ATRIAL RATE: 91 BPM
P AXIS: 75 DEGREES
PR INTERVAL: 174 MS
QRS AXIS: 11 DEGREES
QRSD INTERVAL: 78 MS
QT INTERVAL: 360 MS
QTC INTERVAL: 442 MS
T WAVE AXIS: 47 DEGREES
VENTRICULAR RATE: 91 BPM

## 2017-10-25 NOTE — EMTALA/ACUTE CARE TRANSFER
[facility] EMERGENCY DEPARTMENT  [address] [facility] [address]  Dept: [phone]      EMTALA TRANSFER CONSENT    NAME Abundio Elizondo                                         1970                              MRN [id number]    I have been informed of my rights regarding examination, treatment, and transfer   by Dr Preet Landis DO    Benefits: Continuity of care    Risks: Potential for delay in receiving treatment, Potential deterioration of medical condition, Increased discomfort during transfer, Possible worsening of condition or death during transfer      Transfer Request   I acknowledge that my medical condition has been evaluated and explained to me by the emergency department physician or other qualified medical person and/or my attending physician who has recommended and offered to me further medical examination and treatment  I understand the Hospital's obligation with respect to the treatment and stabilization of my emergency medical condition  I nevertheless request to be transferred  I release the Hospital, the doctor, and any other persons caring for me from all responsibility or liability for any injury or ill effects that may result from my transfer and agree to accept all responsibility for the consequences of my choice to transfer, rather than receive stabilizing treatment at the Hospital  I understand that because the transfer is my request, my insurance may not provide reimbursement for the services  The Hospital will assist and direct me and my family in how to make arrangements for transfer, but the hospital is not liable for any fees charged by the transport service  In spite of this understanding, I refuse to consent to further medical examination and treatment which has been offered to me, and request transfer to  [facility]vin Schwartz Name, [address] : [facility]   I authorize the performance of emergency medical procedures and treatments upon me in both transit and upon arrival at the receiving facility  Additionally, I authorize the release of any and all medical records to the receiving facility and request they be transported with me, if possible  I authorize the performance of emergency medical procedures and treatments upon me in both transit and upon arrival at the receiving facility  Additionally, I authorize the release of any and all medical records to the receiving facility and request they be transported with me, if possible  I understand that the safest mode of transportation during a medical emergency is an ambulance and that the Hospital advocates the use of this mode of transport  Risks of traveling to the receiving facility by car, including absence of medical control, life sustaining equipment, such as oxygen, and medical personnel has been explained to me and I fully understand them  ([place] CORRECT BOX BELOW)  [  ]  I consent to the stated transfer and to be transported by ambulance/helicopter  [  ]  I consent to the stated transfer, but refuse transportation by ambulance and accept full responsibility for my transportation by car  I understand the risks of non-ambulance transfers and I exonerate the Hospital and its staff from any deterioration in my condition that results from this refusal     X___________________________________________    DATE  10/24/17  TIME________  Signature of patient or legally responsible individual signing on patient behalf           RELATIONSHIP TO PATIENT_________________________          Provider Certification    NAME aLdan Smallwood                                        [facility] 1970                              MRN [id number]    A medical screening exam was performed on the above named patient  Based on the examination:    Condition Necessitating Transfer The primary encounter diagnosis was Overdose   Diagnoses of Depression and Suicidal behavior with attempted self-injury were also pertinent to this visit  Patient Condition: The patient has been stabilized such that within reasonable medical probability, no material deterioration of the patient condition or the condition of the unborn child(bobby) is likely to result from the transfer    Reason for Transfer: Level of Care needed not available at this facility (psych)    Transfer Requirements: [facility] [address]   · Space available and qualified personnel available for treatment as acknowledged by    · Agreed to accept transfer and to provide appropriate medical treatment as acknowledged by       medical Dr Jeevan Quintana, Psychiatric Dr Mikayla Rutherford  · Appropriate medical records of the examination and treatment of the patient are provided at the time of transfer   [address] _______  · Transfer will be performed by qualified personnel from [facility]  and appropriate transfer equipment as required, including the use of necessary and appropriate life support measures  Provider Certification: I have examined the patient and explained the following risks and benefits of being transferred/refusing transfer to the patient/family:  General risk, such as traffic hazards, adverse weather conditions, rough terrain or turbulence, possible failure of equipment (including vehicle or aircraft), or consequences of actions of persons outside the control of the transport personnel      Based on these reasonable risks and benefits to the patient and/or the unborn child(bobby), and based upon the information available at the time of the patients examination, I certify that the medical benefits reasonably to be expected from the provision of appropriate medical treatments at another medical facility outweigh the increasing risks, if any, to the individuals medical condition, and in the case of labor to the unborn child, from effecting the transfer      X____________________________________________ DATE 10/24/17 TIME_______      ORIGINAL - SEND TO MEDICAL RECORDS   COPY - SEND WITH PATIENT DURING TRANSFER

## 2017-10-25 NOTE — EMTALA/ACUTE CARE TRANSFER
[facility] EMERGENCY DEPARTMENT  [address] [facility] [address]  Dept: [phone]      EMTALA TRANSFER CONSENT    NAME Flora Hudson                                         1970                              MRN [id number]    I have been informed of my rights regarding examination, treatment, and transfer   by Dr Danita Mark,     Benefits: Continuity of care    Risks: Potential for delay in receiving treatment, Potential deterioration of medical condition, Increased discomfort during transfer, Possible worsening of condition or death during transfer      Transfer Request   I acknowledge that my medical condition has been evaluated and explained to me by the emergency department physician or other qualified medical person and/or my attending physician who has recommended and offered to me further medical examination and treatment  I understand the Hospital's obligation with respect to the treatment and stabilization of my emergency medical condition  I nevertheless request to be transferred  I release the Hospital, the doctor, and any other persons caring for me from all responsibility or liability for any injury or ill effects that may result from my transfer and agree to accept all responsibility for the consequences of my choice to transfer, rather than receive stabilizing treatment at the Hospital  I understand that because the transfer is my request, my insurance may not provide reimbursement for the services  The Hospital will assist and direct me and my family in how to make arrangements for transfer, but the hospital is not liable for any fees charged by the transport service  In spite of this understanding, I refuse to consent to further medical examination and treatment which has been offered to me, and request transfer to  Delvin  Name, [address] : [place]   I authorize the performance of emergency medical procedures and treatments upon me in both transit and upon arrival at the receiving facility  Additionally, I authorize the release of any and all medical records to the receiving facility and request they be transported with me, if possible  I authorize the performance of emergency medical procedures and treatments upon me in both transit and upon arrival at the receiving facility  Additionally, I authorize the release of any and all medical records to the receiving facility and request they be transported with me, if possible  I understand that the safest mode of transportation during a medical emergency is an ambulance and that the Hospital advocates the use of this mode of transport  Risks of traveling to the receiving facility by car, including absence of medical control, life sustaining equipment, such as oxygen, and medical personnel has been explained to me and I fully understand them  ([place] CORRECT BOX BELOW)  [  ]  I consent to the stated transfer and to be transported by ambulance/helicopter  [  ]  I consent to the stated transfer, but refuse transportation by ambulance and accept full responsibility for my transportation by car  I understand the risks of non-ambulance transfers and I exonerate the Hospital and its staff from any deterioration in my condition that results from this refusal     X___________________________________________    DATE  10/24/17  TIME________  Signature of patient or legally responsible individual signing on patient behalf           RELATIONSHIP TO PATIENT_________________________          Provider Certification    NAME Anna Hernandez                                        [facility] 1970                              MRN [id number]    A medical screening exam was performed on the above named patient  Based on the examination:    Condition Necessitating Transfer The primary encounter diagnosis was Overdose   Diagnoses of Depression and Suicidal behavior with attempted self-injury were also pertinent to this visit  Patient Condition: The patient has been stabilized such that within reasonable medical probability, no material deterioration of the patient condition or the condition of the unborn child(bobby) is likely to result from the transfer    Reason for Transfer: Level of Care needed not available at this facility (psych)    Transfer Requirements: [address]   · Space available and qualified personnel available for treatment as acknowledged by    · Agreed to accept transfer and to provide appropriate medical treatment as acknowledged by       Dr Merry Cleveland  · Appropriate medical records of the examination and treatment of the patient are provided at the time of transfer   [address] [facility], [address] _______  · Transfer will be performed by qualified personnel from    and appropriate transfer equipment as required, including the use of necessary and appropriate life support measures  Provider Certification: I have examined the patient and explained the following risks and benefits of being transferred/refusing transfer to the patient/family:  General risk, such as traffic hazards, adverse weather conditions, rough terrain or turbulence, possible failure of equipment (including vehicle or aircraft), or consequences of actions of persons outside the control of the transport personnel      Based on these reasonable risks and benefits to the patient and/or the unborn child(bobby), and based upon the information available at the time of the patients examination, I certify that the medical benefits reasonably to be expected from the provision of appropriate medical treatments at another medical facility outweigh the increasing risks, if any, to the individuals medical condition, and in the case of labor to the unborn child, from effecting the transfer      X____________________________________________ DATE 10/24/17        TIME_______      ORIGINAL - SEND TO MEDICAL RECORDS   COPY - SEND WITH PATIENT DURING TRANSFER

## 2017-10-25 NOTE — ED NOTES
Client resting in room quietly  No distress noted  Remains on close watch     Charmaine Rascon RN  10/24/17 [id number]

## 2018-02-10 ENCOUNTER — EMERGENCY (EMERGENCY)
Facility: HOSPITAL | Age: 48
LOS: 0 days | Discharge: HOME | End: 2018-02-10

## 2018-02-10 VITALS
SYSTOLIC BLOOD PRESSURE: 107 MMHG | TEMPERATURE: 98 F | OXYGEN SATURATION: 100 % | HEART RATE: 89 BPM | DIASTOLIC BLOOD PRESSURE: 67 MMHG | RESPIRATION RATE: 19 BRPM

## 2018-02-10 DIAGNOSIS — K08.89 OTHER SPECIFIED DISORDERS OF TEETH AND SUPPORTING STRUCTURES: ICD-10-CM

## 2018-02-10 DIAGNOSIS — Z98.51 TUBAL LIGATION STATUS: ICD-10-CM

## 2018-02-10 DIAGNOSIS — Z98.51 TUBAL LIGATION STATUS: Chronic | ICD-10-CM

## 2018-02-10 DIAGNOSIS — F17.210 NICOTINE DEPENDENCE, CIGARETTES, UNCOMPLICATED: ICD-10-CM

## 2018-02-10 RX ORDER — AMOXICILLIN 250 MG/5ML
1 SUSPENSION, RECONSTITUTED, ORAL (ML) ORAL
Qty: 20 | Refills: 0 | OUTPATIENT
Start: 2018-02-10 | End: 2018-02-19

## 2018-02-10 NOTE — ED ADULT NURSE NOTE - CHPI ED SYMPTOMS NEG
no decreased eating/drinking/no headache/no mouth sores/no ear pain/no nasal congestion/no bleeding gums/no fever

## 2018-02-10 NOTE — ED PROVIDER NOTE - PROGRESS NOTE DETAILS
pt declined dental eval in er, will go to dental clinic monday  Discussed possible side effects of abx. including but not limited to cdiff/resistance/GI upset. if intolerance, dc use and return to office . Also if there is no improvement, return for re-evaluation. advised to take probiotics.

## 2018-02-12 ENCOUNTER — EMERGENCY (EMERGENCY)
Facility: HOSPITAL | Age: 48
LOS: 1 days | Discharge: HOME | End: 2018-02-12

## 2018-02-12 VITALS
HEART RATE: 95 BPM | DIASTOLIC BLOOD PRESSURE: 64 MMHG | OXYGEN SATURATION: 100 % | SYSTOLIC BLOOD PRESSURE: 134 MMHG | RESPIRATION RATE: 18 BRPM | TEMPERATURE: 98 F

## 2018-02-12 DIAGNOSIS — Z79.1 LONG TERM (CURRENT) USE OF NON-STEROIDAL ANTI-INFLAMMATORIES (NSAID): ICD-10-CM

## 2018-02-12 DIAGNOSIS — F17.210 NICOTINE DEPENDENCE, CIGARETTES, UNCOMPLICATED: ICD-10-CM

## 2018-02-12 DIAGNOSIS — Z79.2 LONG TERM (CURRENT) USE OF ANTIBIOTICS: ICD-10-CM

## 2018-02-12 DIAGNOSIS — K08.89 OTHER SPECIFIED DISORDERS OF TEETH AND SUPPORTING STRUCTURES: ICD-10-CM

## 2018-02-12 DIAGNOSIS — Z98.51 TUBAL LIGATION STATUS: Chronic | ICD-10-CM

## 2018-02-12 NOTE — ED PROVIDER NOTE - PHYSICAL EXAMINATION
Physical Exam    Vital Signs: I have reviewed the initial vital signs.  Constitutional: well-nourished, appears stated age, no acute distress non toxic   ENT: +tooth #3 TTP. no discharge, erythema, exudate, open wounds. no LAD.   Cardiovascular: regular rate, regular rhythm, well-perfused extremities  Respiratory: unlabored respiratory effort, clear to auscultation bilaterally  Gastrointestinal: soft, non-tender abdomen  Musculoskeletal: supple neck, no lower extremity edema  Integumentary: warm, dry, no rash  Neurologic: awake, alert, sensation intact

## 2018-02-12 NOTE — ED PROVIDER NOTE - NS ED ROS FT
Review of Systems    Constitutional: (-) fever  Eyes/ENT: (-) blurry vision  Cardiovascular: (-) chest pain  Respiratory: (-) cough, (-) shortness of breath  Gastrointestinal: (-) vomiting, (-) diarrhea  Musculoskeletal: (-) neck pain  Integumentary: (-) rash  Neurological: (-) headache

## 2018-02-12 NOTE — ED PROVIDER NOTE - OBJECTIVE STATEMENT
49 y/o F without PMH, recently here for same issue presents with R upper tooth pain x 1.5-2 wks. She reports a filling fell out a year ago and she has not treated it and does not have a dentist here. She is on amoxicillin. She denies CP, SOB, foul odor, n/v, fevers, chills, sweats, discharge, difficulty swallowing, sore throat, difficulty opening mouth, decreased PO.

## 2018-02-12 NOTE — ED PROVIDER NOTE - MEDICAL DECISION MAKING DETAILS
pt with dental pain. no red flags. on abx. will transfer to dental. Counseled on red flags and to return for them. Counseled on importance of follow up. Patient repeats back instructions.

## 2018-07-13 ENCOUNTER — INPATIENT (INPATIENT)
Facility: HOSPITAL | Age: 48
LOS: 2 days | Discharge: NURSING, REHAB OR LONG-TERM CARE | End: 2018-07-16
Attending: INTERNAL MEDICINE | Admitting: INTERNAL MEDICINE

## 2018-07-13 VITALS
TEMPERATURE: 98 F | HEIGHT: 66 IN | SYSTOLIC BLOOD PRESSURE: 109 MMHG | WEIGHT: 149.91 LBS | HEART RATE: 87 BPM | DIASTOLIC BLOOD PRESSURE: 70 MMHG | OXYGEN SATURATION: 93 %

## 2018-07-13 DIAGNOSIS — Z98.51 TUBAL LIGATION STATUS: Chronic | ICD-10-CM

## 2018-07-13 DIAGNOSIS — N39.0 URINARY TRACT INFECTION, SITE NOT SPECIFIED: ICD-10-CM

## 2018-07-13 DIAGNOSIS — Z72.0 TOBACCO USE: ICD-10-CM

## 2018-07-13 DIAGNOSIS — F10.20 ALCOHOL DEPENDENCE, UNCOMPLICATED: ICD-10-CM

## 2018-07-13 DIAGNOSIS — F32.9 MAJOR DEPRESSIVE DISORDER, SINGLE EPISODE, UNSPECIFIED: ICD-10-CM

## 2018-07-13 LAB
ALBUMIN SERPL ELPH-MCNC: 4.4 G/DL — SIGNIFICANT CHANGE UP (ref 3.5–5.2)
ALP SERPL-CCNC: 69 U/L — SIGNIFICANT CHANGE UP (ref 30–115)
ALT FLD-CCNC: 40 U/L — SIGNIFICANT CHANGE UP (ref 0–41)
AMMONIA BLD-MCNC: 55 UMOL/L — SIGNIFICANT CHANGE UP (ref 11–55)
ANION GAP SERPL CALC-SCNC: 15 MMOL/L — HIGH (ref 7–14)
APAP SERPL-MCNC: <5 UG/ML — LOW (ref 10–30)
APPEARANCE UR: CLEAR — SIGNIFICANT CHANGE UP
APTT BLD: 24.5 SEC — LOW (ref 27–39.2)
AST SERPL-CCNC: 43 U/L — HIGH (ref 0–41)
BACTERIA # UR AUTO: (no result)
BASOPHILS # BLD AUTO: 0.03 K/UL — SIGNIFICANT CHANGE UP (ref 0–0.2)
BASOPHILS NFR BLD AUTO: 0.3 % — SIGNIFICANT CHANGE UP (ref 0–1)
BILIRUB SERPL-MCNC: 0.2 MG/DL — SIGNIFICANT CHANGE UP (ref 0.2–1.2)
BILIRUB UR-MCNC: NEGATIVE — SIGNIFICANT CHANGE UP
BUN SERPL-MCNC: 7 MG/DL — LOW (ref 10–20)
CALCIUM SERPL-MCNC: 8.9 MG/DL — SIGNIFICANT CHANGE UP (ref 8.5–10.1)
CHLORIDE SERPL-SCNC: 104 MMOL/L — SIGNIFICANT CHANGE UP (ref 98–110)
CO2 SERPL-SCNC: 25 MMOL/L — SIGNIFICANT CHANGE UP (ref 17–32)
COLOR SPEC: YELLOW — SIGNIFICANT CHANGE UP
CREAT SERPL-MCNC: 0.7 MG/DL — SIGNIFICANT CHANGE UP (ref 0.7–1.5)
DIFF PNL FLD: (no result)
DRUG SCREEN 1, URINE RESULT: SIGNIFICANT CHANGE UP
EOSINOPHIL # BLD AUTO: 0.18 K/UL — SIGNIFICANT CHANGE UP (ref 0–0.7)
EOSINOPHIL NFR BLD AUTO: 1.8 % — SIGNIFICANT CHANGE UP (ref 0–8)
EPI CELLS # UR: (no result) /HPF
ETHANOL SERPL-MCNC: 125 MG/DL — HIGH
GLUCOSE SERPL-MCNC: 78 MG/DL — SIGNIFICANT CHANGE UP (ref 70–99)
GLUCOSE UR QL: NEGATIVE MG/DL — SIGNIFICANT CHANGE UP
HCG UR QL: NEGATIVE — SIGNIFICANT CHANGE UP
HCT VFR BLD CALC: 31.3 % — LOW (ref 37–47)
HGB BLD-MCNC: 11 G/DL — LOW (ref 12–16)
IMM GRANULOCYTES NFR BLD AUTO: 0.3 % — SIGNIFICANT CHANGE UP (ref 0.1–0.3)
INR BLD: 1.05 RATIO — SIGNIFICANT CHANGE UP (ref 0.65–1.3)
KETONES UR-MCNC: NEGATIVE — SIGNIFICANT CHANGE UP
LEUKOCYTE ESTERASE UR-ACNC: (no result)
LIDOCAIN IGE QN: 19 U/L — SIGNIFICANT CHANGE UP (ref 7–60)
LYMPHOCYTES # BLD AUTO: 2.08 K/UL — SIGNIFICANT CHANGE UP (ref 1.2–3.4)
LYMPHOCYTES # BLD AUTO: 21.3 % — SIGNIFICANT CHANGE UP (ref 20.5–51.1)
MAGNESIUM SERPL-MCNC: 2.2 MG/DL — SIGNIFICANT CHANGE UP (ref 1.8–2.4)
MCHC RBC-ENTMCNC: 27.9 PG — SIGNIFICANT CHANGE UP (ref 27–31)
MCHC RBC-ENTMCNC: 35.1 G/DL — SIGNIFICANT CHANGE UP (ref 32–37)
MCV RBC AUTO: 79.4 FL — LOW (ref 81–99)
MONOCYTES # BLD AUTO: 0.62 K/UL — HIGH (ref 0.1–0.6)
MONOCYTES NFR BLD AUTO: 6.4 % — SIGNIFICANT CHANGE UP (ref 1.7–9.3)
NEUTROPHILS # BLD AUTO: 6.81 K/UL — HIGH (ref 1.4–6.5)
NEUTROPHILS NFR BLD AUTO: 69.9 % — SIGNIFICANT CHANGE UP (ref 42.2–75.2)
NITRITE UR-MCNC: POSITIVE
NRBC # BLD: 0 /100 WBCS — SIGNIFICANT CHANGE UP (ref 0–0)
PH UR: 6 — SIGNIFICANT CHANGE UP (ref 5–8)
PLATELET # BLD AUTO: 237 K/UL — SIGNIFICANT CHANGE UP (ref 130–400)
POTASSIUM SERPL-MCNC: 3.7 MMOL/L — SIGNIFICANT CHANGE UP (ref 3.5–5)
POTASSIUM SERPL-SCNC: 3.7 MMOL/L — SIGNIFICANT CHANGE UP (ref 3.5–5)
PROT SERPL-MCNC: 6.6 G/DL — SIGNIFICANT CHANGE UP (ref 6–8)
PROT UR-MCNC: 30 MG/DL
PROTHROM AB SERPL-ACNC: 11.4 SEC — SIGNIFICANT CHANGE UP (ref 9.95–12.87)
RBC # BLD: 3.94 M/UL — LOW (ref 4.2–5.4)
RBC # FLD: 14.5 % — SIGNIFICANT CHANGE UP (ref 11.5–14.5)
RBC CASTS # UR COMP ASSIST: >50 /HPF
SALICYLATES SERPL-MCNC: <0.3 MG/DL — LOW (ref 4–30)
SODIUM SERPL-SCNC: 144 MMOL/L — SIGNIFICANT CHANGE UP (ref 135–146)
SP GR SPEC: 1.01 — SIGNIFICANT CHANGE UP (ref 1.01–1.03)
UROBILINOGEN FLD QL: 0.2 MG/DL — SIGNIFICANT CHANGE UP (ref 0.2–0.2)
WBC # BLD: 9.75 K/UL — SIGNIFICANT CHANGE UP (ref 4.8–10.8)
WBC # FLD AUTO: 9.75 K/UL — SIGNIFICANT CHANGE UP (ref 4.8–10.8)
WBC UR QL: (no result) /HPF

## 2018-07-13 RX ORDER — NICOTINE POLACRILEX 2 MG
1 GUM BUCCAL DAILY
Qty: 0 | Refills: 0 | Status: DISCONTINUED | OUTPATIENT
Start: 2018-07-13 | End: 2018-07-16

## 2018-07-13 RX ORDER — IBUPROFEN 200 MG
400 TABLET ORAL EVERY 12 HOURS
Qty: 0 | Refills: 0 | Status: DISCONTINUED | OUTPATIENT
Start: 2018-07-13 | End: 2018-07-16

## 2018-07-13 RX ORDER — NITROFURANTOIN MACROCRYSTAL 50 MG
100 CAPSULE ORAL ONCE
Qty: 0 | Refills: 0 | Status: COMPLETED | OUTPATIENT
Start: 2018-07-13 | End: 2018-07-13

## 2018-07-13 RX ORDER — ACETAMINOPHEN 500 MG
650 TABLET ORAL EVERY 8 HOURS
Qty: 0 | Refills: 0 | Status: DISCONTINUED | OUTPATIENT
Start: 2018-07-13 | End: 2018-07-16

## 2018-07-13 RX ORDER — THIAMINE MONONITRATE (VIT B1) 100 MG
100 TABLET ORAL DAILY
Qty: 0 | Refills: 0 | Status: DISCONTINUED | OUTPATIENT
Start: 2018-07-13 | End: 2018-07-16

## 2018-07-13 RX ORDER — QUETIAPINE FUMARATE 200 MG/1
200 TABLET, FILM COATED ORAL AT BEDTIME
Qty: 0 | Refills: 0 | Status: DISCONTINUED | OUTPATIENT
Start: 2018-07-13 | End: 2018-07-14

## 2018-07-13 RX ADMIN — Medication 1 PATCH: at 18:58

## 2018-07-13 RX ADMIN — Medication 650 MILLIGRAM(S): at 18:57

## 2018-07-13 RX ADMIN — Medication 650 MILLIGRAM(S): at 20:20

## 2018-07-13 RX ADMIN — QUETIAPINE FUMARATE 200 MILLIGRAM(S): 200 TABLET, FILM COATED ORAL at 20:59

## 2018-07-13 RX ADMIN — Medication 100 MILLIGRAM(S): at 16:19

## 2018-07-13 RX ADMIN — Medication 1 TABLET(S): at 20:59

## 2018-07-13 NOTE — ED PROVIDER NOTE - DIAGNOSIS COUNSELING, MDM
conducted a detailed discussion... Patient to be admitted to detox. Any available test results were discussed with patient. Patient endorsed understanding.

## 2018-07-13 NOTE — H&P ADULT - NSHPLABSRESULTS_GEN_ALL_CORE
11.0   9.75  )-----------( 237      ( 2018 13:24 )             31.3       07-    144  |  104  |  7<L>  ----------------------------<  78  3.7   |  25  |  0.7    Ca    8.9      2018 13:24  Mg     2.2         TPro  6.6  /  Alb  4.4  /  TBili  0.2  /  DBili  x   /  AST  43<H>  /  ALT  40  /  AlkPhos  69  13        Urinalysis Basic - ( 2018 13:30 )    Color: Yellow / Appearance: Clear / S.010 / pH: x  Gluc: x / Ketone: Negative  / Bili: Negative / Urobili: 0.2 mg/dL   Blood: x / Protein: 30 mg/dL / Nitrite: Positive   Leuk Esterase: Moderate / RBC: >50 /HPF / WBC 26-50 /HPF   Sq Epi: x / Non Sq Epi: Few /HPF / Bacteria: Many    PT/INR - ( 2018 13:24 )   PT: 11.40 sec;   INR: 1.05 ratio       PTT - ( 2018 13:24 )  PTT:24.5 sec     Xray Chest 2 Views PA/Lat (18 @ 13:29) >    Impression:      No radiographic evidence of acute cardiopulmonary disease.

## 2018-07-13 NOTE — H&P ADULT - ASSESSMENT
49 y/o female Alcohol dependence, tobacco use, now with UTI and very weepy during interview: feeling Depressed.

## 2018-07-13 NOTE — ED PROVIDER NOTE - CARE PLAN
Principal Discharge DX:	UTI (urinary tract infection) Principal Discharge DX:	UTI (urinary tract infection)  Assessment and plan of treatment:	admit to [facility] for medical supervised detox

## 2018-07-13 NOTE — ED PROVIDER NOTE - PHYSICAL EXAMINATION
GENERAL: NAD, well-developed. Patient is speaking in full sentences. No slurring of speech. Ambulating well in the ED. No unsteady gait.  CHEST/LUNG: Clear to auscultation bilaterally; No wheeze, rhonchi, or rales  HEART: Regular rate and rhythm; No murmurs, rubs, or gallops  ABDOMEN: Soft, Nontender, Nondistended; Bowel sounds present x 4. No CVAT  EXTREMITIES:  Radial pulses present B/L.  PSYCH: AAOx3, cooperative, appropriate  NEUROLOGY: AAOx3. Gait within normal limits  MSK: No visible signs of trauma, ecchymosis, erythema, or swelling. No midline spinal TTP.   SKIN: No rashes or lesions

## 2018-07-13 NOTE — H&P ADULT - HISTORY OF PRESENT ILLNESS
47 y/o female Alcohol dependence X 10 yrs, drinks daily, at least 1 qt vodka/da, last drink yesterday.   + Tremors, + Blackouts,  + Hallucinations,  No Withdrawl seizures.  Cocaine dependence X 15 yrs, uses daily, last used yesterday.  Heroin: stopped 8 months ago.

## 2018-07-13 NOTE — ED PROVIDER NOTE - OBJECTIVE STATEMENT
47 yo female in menopause with PMH of chronic back pain, alcohol dependence and cocaine dependence presents to the  ED for detox. Patient admits to having detox one year ago and relapsing yesterday. Patient drank 2 beers this morning. Patient admits to intake of 6 beers and half a bottle of vodka last night. Patient also used cocaine last night.  Patient denies fever, chills, SOB, chest pain, inability to ambulate, slurring speech, abdominal pain, N/V, back pain, numbness/tingling/ extremities, dizziness, or headache.

## 2018-07-13 NOTE — ED PROVIDER NOTE - NS ED ROS FT
CONSTITUTIONAL: No weakness, fevers, or chills. No diaphoresis.  EYES/ENT: No visual changes  NECK: No pain or stiffness  RESPIRATORY: No cough No shortness of breath  CARDIOVASCULAR: No chest pain or palpitations  GASTROINTESTINAL: No abdominal or epigastric pain. No nausea or vomiting  GENITOURINARY: No dysuria or hematuria  NEUROLOGICAL: + tremors No numbness or weakness or tingling to extremities. No headache or dizziness.   MSK: No back pain.  SKIN: No itching, rashes

## 2018-07-14 DIAGNOSIS — F41.9 ANXIETY DISORDER, UNSPECIFIED: ICD-10-CM

## 2018-07-14 DIAGNOSIS — F10.982 ALCOHOL USE, UNSPECIFIED WITH ALCOHOL-INDUCED SLEEP DISORDER: ICD-10-CM

## 2018-07-14 DIAGNOSIS — F32.9 MAJOR DEPRESSIVE DISORDER, SINGLE EPISODE, UNSPECIFIED: ICD-10-CM

## 2018-07-14 LAB
HAV IGM SER-ACNC: SIGNIFICANT CHANGE UP
HBV CORE IGM SER-ACNC: SIGNIFICANT CHANGE UP
HBV SURFACE AG SER-ACNC: SIGNIFICANT CHANGE UP
HCV AB S/CO SERPL IA: 0.08 S/CO — SIGNIFICANT CHANGE UP
HCV AB SERPL-IMP: SIGNIFICANT CHANGE UP
T PALLIDUM AB TITR SER: NEGATIVE — SIGNIFICANT CHANGE UP

## 2018-07-14 RX ORDER — TRAZODONE HCL 50 MG
100 TABLET ORAL AT BEDTIME
Qty: 0 | Refills: 0 | Status: DISCONTINUED | OUTPATIENT
Start: 2018-07-14 | End: 2018-07-14

## 2018-07-14 RX ORDER — QUETIAPINE FUMARATE 200 MG/1
200 TABLET, FILM COATED ORAL AT BEDTIME
Qty: 0 | Refills: 0 | Status: DISCONTINUED | OUTPATIENT
Start: 2018-07-14 | End: 2018-07-16

## 2018-07-14 RX ORDER — HYDROXYZINE HCL 10 MG
50 TABLET ORAL
Qty: 0 | Refills: 0 | Status: DISCONTINUED | OUTPATIENT
Start: 2018-07-14 | End: 2018-07-16

## 2018-07-14 RX ADMIN — Medication 400 MILLIGRAM(S): at 10:09

## 2018-07-14 RX ADMIN — Medication 1 TABLET(S): at 08:30

## 2018-07-14 RX ADMIN — Medication 1 TABLET(S): at 20:51

## 2018-07-14 RX ADMIN — Medication 1 PATCH: at 08:30

## 2018-07-14 RX ADMIN — Medication 400 MILLIGRAM(S): at 17:45

## 2018-07-14 RX ADMIN — Medication 650 MILLIGRAM(S): at 07:05

## 2018-07-14 RX ADMIN — Medication 25 MILLIGRAM(S): at 10:09

## 2018-07-14 RX ADMIN — Medication 100 MILLIGRAM(S): at 08:30

## 2018-07-14 RX ADMIN — Medication 650 MILLIGRAM(S): at 17:46

## 2018-07-14 RX ADMIN — Medication 25 MILLIGRAM(S): at 07:08

## 2018-07-14 RX ADMIN — Medication 1 PATCH: at 17:46

## 2018-07-14 RX ADMIN — Medication 50 MILLIGRAM(S): at 22:03

## 2018-07-14 RX ADMIN — QUETIAPINE FUMARATE 200 MILLIGRAM(S): 200 TABLET, FILM COATED ORAL at 22:03

## 2018-07-14 RX ADMIN — Medication 25 MILLIGRAM(S): at 11:52

## 2018-07-14 NOTE — CHART NOTE - NSCHARTNOTEFT_GEN_A_CORE
pt c/o oversedation from seroquel.  psych consult appreciated.  will d/c seroquel and start trazodone.  observe pt sx wi new meds. pt c/o oversedation from seroquel.  psych consult appreciated.  will d/c seroquel and start trazodone.  observe pt sx with med change and adjust prn. pt requesting vistaril prn anxiety will order, s/e drug interactions d/w pt

## 2018-07-14 NOTE — CONSULT NOTE ADULT - PROBLEM SELECTOR RECOMMENDATION 3
avoid seroquel if possible due to multiple side effects. Consider Trazodone 50-100mg HS PRN for insomnia.

## 2018-07-14 NOTE — CONSULT NOTE ADULT - PROBLEM SELECTOR RECOMMENDATION 9
Depression likely worsened due to recent drug and etoh use. Continue librium protocol, reassess if necessary once Pt is on CDRU and not in acute withdrawal. Provide support and reassurance. Refer to out-patient behavioral health clinic after the discharge.

## 2018-07-14 NOTE — CONSULT NOTE ADULT - SUBJECTIVE AND OBJECTIVE BOX
CC: "i feel depressed"    HPI: 49yo W with h/o opioid, cocaine, etoh use d/o, admitted to CDU yesterday, reports feeling "depressed" and anxious, frustrated with herself and feeling like a failure since she relapsed on etoh and drugs 3 days PTA after 8 months of abstinence. She reports that prior to relapse she was still feeling depressed but better than now. She was going to AA/NA, had a sponsor, enjoyed reading and was social. She still has been frustrated with her homelessness. She denies SI and there are no psychotic Sx. She wants to continue Tx at [facility].    PPH: denies h/o IPP or SAs, reports past out-pt Tx with Seroquel "for insomnia"    PMH: UTI    FH: both parents were drug addicts. No FH of psychiatric illness.    SH: Homeless, was living at recovery house PTA. 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  Has 2 adult children, estranged from family.     MSE: A,Ox3, well related, good eye contact, no PMA/R, speech soft, mood "depressed", affect constricted, t/p linear, t/c no si/hi/ah/vh, no delusions, i/j not impaired.

## 2018-07-14 NOTE — CONSULT NOTE ADULT - ASSESSMENT
49yo W with h/o opioid, cocaine, etoh use d/o, admitted to CDU after relapse on drugs and etoh, reports feeling depressed, anxious and frustrated with herself due to her relapse, homelessness and poor social support. 49yo W with h/o opioid, cocaine, etoh use d/o, admitted to CDU after relapse on drugs and etoh, reports feeling depressed, anxious and frustrated with herself due to her relapse, homelessness and poor social support.    Dx etoh and cocaine induced depression, r/o dysthymic d/o.

## 2018-07-15 RX ADMIN — Medication 1 PATCH: at 09:05

## 2018-07-15 RX ADMIN — QUETIAPINE FUMARATE 200 MILLIGRAM(S): 200 TABLET, FILM COATED ORAL at 20:59

## 2018-07-15 RX ADMIN — Medication 50 MILLIGRAM(S): at 19:37

## 2018-07-15 RX ADMIN — Medication 1 TABLET(S): at 09:05

## 2018-07-15 RX ADMIN — Medication 1 PATCH: at 12:36

## 2018-07-15 RX ADMIN — Medication 1 TABLET(S): at 20:59

## 2018-07-15 RX ADMIN — Medication 100 MILLIGRAM(S): at 09:05

## 2018-07-15 NOTE — CHART NOTE - NSCHARTNOTEFT_GEN_A_CORE
Subsequent Inpatient Encounter                                       Detox Unit    BRANDI CEBALLOS   48y   Female      Chief Complaint:    Follow up for Alcohol  Dependency    HPI:     I reviewed previous notes. No Change, except if noted below.             Detail:_    ROS:   I reviewed with patient.  No changes from previous notes except if noted below.             Detail: _    PFSH I reviewed with patient. No changes from previous notes except if noted below.             Detail_    Medication reconciliation performed.    MEDICATIONS  (STANDING):  nicotine - 21 mG/24Hr(s) Patch 1 patch Transdermal daily  QUEtiapine 200 milliGRAM(s) Oral at bedtime  thiamine 100 milliGRAM(s) Oral daily  trimethoprim  160 mG/sulfamethoxazole 800 mG 1 Tablet(s) Oral every 12 hours      MEDICATIONS  (PRN):  acetaminophen   Tablet. 650 milliGRAM(s) Oral every 8 hours PRN Mild Pain (1 - 3)  aluminum hydroxide/magnesium hydroxide/simethicone Suspension 30 milliLiter(s) Oral every 6 hours PRN Dyspepsia  bismuth subsalicylate Liquid 30 milliLiter(s) Oral every 6 hours PRN Diarrhea  chlordiazePOXIDE 50 milliGRAM(s) Oral every 1 hour PRN Alcohol Withdrawal Symptoms  chlordiazePOXIDE 25 milliGRAM(s) Oral every 2 hours PRN Alcohol Withdrawal Symptoms  hydrOXYzine hydrochloride 50 milliGRAM(s) Oral four times a day PRN Anxiety  ibuprofen  Tablet 400 milliGRAM(s) Oral every 12 hours PRN Mild pain      T(C): 36.5 (07-15-18 @ 06:00), Max: 37.2 (18 @ 18:00)  HR: 78 (07-15-18 @ 06:00) (77 - 99)  BP: 91/55 (07-15-18 @ 06:00) (90/50 - 118/60)  RR: 16 (07-15-18 @ 06:00) (14 - 16)  SpO2: --    PHYSICAL EXAM:      Constitutional: NAD, A&O x3    Eyes: PERRLA, no conjuctivitis    Neck: no lymphadenopathy    Respiratory: +air entry, no rales, no rhonchi, no wheezes    Cardiovascular: +S1 and S2, regular rate and rhythm    Gastrointestinal: +BS, soft, non-tender, not distended    Extremities:  no edema, no calf tenderness    Skin: no rashes, normal turgor                            11.0   9.75  )-----------( 237      ( 2018 13:24 )             31.3       144  |  104  |  7<L>  ----------------------------<  78  3.7   |  25  |  0.7    Ca    8.9      2018 13:24  Mg     2.2         TPro  6.6  /  Alb  4.4  /  TBili  0.2  /  DBili  x   /  AST  43<H>  /  ALT  40  /  AlkPhos  69    PT/INR - ( 2018 13:24 )   PT: 11.40 sec;   INR: 1.05 ratio         PTT - ( 2018 13:24 )  PTT:24.5 sec  Magnesium, Serum: 2.2 mg/dL (18 @ 13:24)  Ammonia, Serum: 55 umol/L (18 @ 13:24)  Treponema Pallidum Antibody Interpretation: Negative (18 @ 13:24)  Hepatitis B Surface Antigen: Nonreact (18 @ 13:24)  Hepatitis C Virus S/CO Ratio: 0.08 S/CO (18 @ 13:24)    Hepatitis C Virus Interpretation: Nonreact (18 @ 13:24)      Urinalysis Basic - ( 2018 13:30 )    Color: Yellow / Appearance: Clear / S.010 / pH: x  Gluc: x / Ketone: Negative  / Bili: Negative / Urobili: 0.2 mg/dL   Blood: x / Protein: 30 mg/dL / Nitrite: Positive   Leuk Esterase: Moderate / RBC: >50 /HPF / WBC 26-50 /HPF   Sq Epi: x / Non Sq Epi: Few /HPF / Bacteria: Many    Drug Screen 1, Urine Result: Done (18 @ 19:22)  Drug Screen 1, Urine Result: Done (18 @ 13:30)        Impression and Plan:    Primary Diagnosis:  Alcohol Dependency                                Medication: Librium Protocol/ CIWA Protocol    Secondary Diagnosis:                                                                  Medication:    Tertiary Diagnosis:                                                                       Medication      Continue Detox Protocols. Use of PRNS as needed for withdrawal and comfort.    Adjustments to protocols:    Labs/ Tests reviewed.    Tests ordered:     Likely Disposition: X___Home       ___Rehab       ___Outpatient Program    ___Self Help     _____Other    Estimated Length of stay:_3___

## 2018-07-16 ENCOUNTER — INPATIENT (INPATIENT)
Facility: HOSPITAL | Age: 48
LOS: 21 days | Discharge: HOME | End: 2018-08-07
Attending: INTERNAL MEDICINE | Admitting: INTERNAL MEDICINE

## 2018-07-16 VITALS
SYSTOLIC BLOOD PRESSURE: 105 MMHG | HEART RATE: 93 BPM | DIASTOLIC BLOOD PRESSURE: 68 MMHG | TEMPERATURE: 99 F | RESPIRATION RATE: 14 BRPM

## 2018-07-16 VITALS
HEART RATE: 88 BPM | SYSTOLIC BLOOD PRESSURE: 98 MMHG | HEIGHT: 66 IN | WEIGHT: 149.91 LBS | TEMPERATURE: 97 F | RESPIRATION RATE: 18 BRPM | DIASTOLIC BLOOD PRESSURE: 57 MMHG

## 2018-07-16 DIAGNOSIS — Z98.51 TUBAL LIGATION STATUS: Chronic | ICD-10-CM

## 2018-07-16 DIAGNOSIS — F10.20 ALCOHOL DEPENDENCE, UNCOMPLICATED: ICD-10-CM

## 2018-07-16 PROBLEM — M54.9 DORSALGIA, UNSPECIFIED: Chronic | Status: ACTIVE | Noted: 2018-07-13

## 2018-07-16 PROBLEM — F14.10 COCAINE ABUSE, UNCOMPLICATED: Chronic | Status: ACTIVE | Noted: 2018-07-13

## 2018-07-16 RX ORDER — QUETIAPINE FUMARATE 200 MG/1
200 TABLET, FILM COATED ORAL AT BEDTIME
Qty: 0 | Refills: 0 | Status: DISCONTINUED | OUTPATIENT
Start: 2018-07-16 | End: 2018-08-07

## 2018-07-16 RX ORDER — THIAMINE MONONITRATE (VIT B1) 100 MG
100 TABLET ORAL DAILY
Qty: 0 | Refills: 0 | Status: DISCONTINUED | OUTPATIENT
Start: 2018-07-16 | End: 2018-08-07

## 2018-07-16 RX ORDER — IBUPROFEN 200 MG
400 TABLET ORAL EVERY 6 HOURS
Qty: 0 | Refills: 0 | Status: DISCONTINUED | OUTPATIENT
Start: 2018-07-16 | End: 2018-08-07

## 2018-07-16 RX ORDER — PANTOPRAZOLE SODIUM 20 MG/1
40 TABLET, DELAYED RELEASE ORAL
Qty: 0 | Refills: 0 | Status: DISCONTINUED | OUTPATIENT
Start: 2018-07-16 | End: 2018-08-07

## 2018-07-16 RX ORDER — MAGNESIUM HYDROXIDE 400 MG/1
30 TABLET, CHEWABLE ORAL DAILY
Qty: 0 | Refills: 0 | Status: DISCONTINUED | OUTPATIENT
Start: 2018-07-16 | End: 2018-08-07

## 2018-07-16 RX ADMIN — Medication 100 MILLIGRAM(S): at 08:45

## 2018-07-16 RX ADMIN — Medication 1 PATCH: at 08:46

## 2018-07-16 RX ADMIN — QUETIAPINE FUMARATE 200 MILLIGRAM(S): 200 TABLET, FILM COATED ORAL at 22:58

## 2018-07-16 RX ADMIN — Medication 1 TABLET(S): at 08:45

## 2018-07-16 RX ADMIN — Medication 1 PATCH: at 08:45

## 2018-07-16 NOTE — CHART NOTE - NSCHARTNOTEFT_GEN_A_CORE
Subsequent Inpatient Encounter                                       Detox Unit    BRANDI CEBALLOS   48y   Female      Chief Complaint:    Follow up for Alcohol  Dependency    HPI:     I reviewed previous notes. No Change, except if noted below.             Detail:_    ROS:   I reviewed with patient.  No changes from previous notes except if noted below.             Detail: _    PFSH I reviewed with patient. No changes from previous notes except if noted below.             Detail_    Medication reconciliation performed.    MEDICATIONS  (STANDING):  nicotine - 21 mG/24Hr(s) Patch 1 patch Transdermal daily  QUEtiapine 200 milliGRAM(s) Oral at bedtime  thiamine 100 milliGRAM(s) Oral daily  trimethoprim  160 mG/sulfamethoxazole 800 mG 1 Tablet(s) Oral every 12 hours      MEDICATIONS  (PRN):  acetaminophen   Tablet. 650 milliGRAM(s) Oral every 8 hours PRN Mild Pain (1 - 3)  aluminum hydroxide/magnesium hydroxide/simethicone Suspension 30 milliLiter(s) Oral every 6 hours PRN Dyspepsia  bismuth subsalicylate Liquid 30 milliLiter(s) Oral every 6 hours PRN Diarrhea  chlordiazePOXIDE 50 milliGRAM(s) Oral every 1 hour PRN Alcohol Withdrawal Symptoms  chlordiazePOXIDE 25 milliGRAM(s) Oral every 2 hours PRN Alcohol Withdrawal Symptoms  hydrOXYzine hydrochloride 50 milliGRAM(s) Oral four times a day PRN Anxiety  ibuprofen  Tablet 400 milliGRAM(s) Oral every 12 hours PRN Mild pain      T(C): 36.5 (07-15-18 @ 06:00), Max: 37.2 (18 @ 18:00)  HR: 78 (07-15-18 @ 06:00) (77 - 99)  BP: 91/55 (07-15-18 @ 06:00) (90/50 - 118/60)  RR: 16 (07-15-18 @ 06:00) (14 - 16)  SpO2: --    PHYSICAL EXAM:      Constitutional: NAD, A&O x3    Eyes: PERRLA, no conjuctivitis    Neck: no lymphadenopathy    Respiratory: +air entry, no rales, no rhonchi, no wheezes    Cardiovascular: +S1 and S2, regular rate and rhythm    Gastrointestinal: +BS, soft, non-tender, not distended    Extremities:  no edema, no calf tenderness    Skin: no rashes, normal turgor                            11.0   9.75  )-----------( 237      ( 2018 13:24 )             31.3       144  |  104  |  7<L>  ----------------------------<  78  3.7   |  25  |  0.7    Ca    8.9      2018 13:24  Mg     2.2         TPro  6.6  /  Alb  4.4  /  TBili  0.2  /  DBili  x   /  AST  43<H>  /  ALT  40  /  AlkPhos  69    PT/INR - ( 2018 13:24 )   PT: 11.40 sec;   INR: 1.05 ratio         PTT - ( 2018 13:24 )  PTT:24.5 sec  Magnesium, Serum: 2.2 mg/dL (18 @ 13:24)  Ammonia, Serum: 55 umol/L (18 @ 13:24)  Treponema Pallidum Antibody Interpretation: Negative (18 @ 13:24)  Hepatitis B Surface Antigen: Nonreact (18 @ 13:24)  Hepatitis C Virus S/CO Ratio: 0.08 S/CO (18 @ 13:24)    Hepatitis C Virus Interpretation: Nonreact (18 @ 13:24)      Urinalysis Basic - ( 2018 13:30 )    Color: Yellow / Appearance: Clear / S.010 / pH: x  Gluc: x / Ketone: Negative  / Bili: Negative / Urobili: 0.2 mg/dL   Blood: x / Protein: 30 mg/dL / Nitrite: Positive   Leuk Esterase: Moderate / RBC: >50 /HPF / WBC 26-50 /HPF   Sq Epi: x / Non Sq Epi: Few /HPF / Bacteria: Many    Drug Screen 1, Urine Result: Done (18 @ 19:22)  Drug Screen 1, Urine Result: Done (18 @ 13:30)        Impression and Plan:    Primary Diagnosis:  Alcohol Dependency                                Medication: Librium Protocol/ CIWA Protocol completed.  For discharge today    Secondary Diagnosis:                                                                  Medication:    Tertiary Diagnosis:                                                                       Medication      Continue Detox Protocols. Use of PRNS as needed for withdrawal and comfort.    Adjustments to protocols:    Labs/ Tests reviewed.    Tests ordered:     Likely Disposition: X___Home       ___Rehab       ___Outpatient Program    ___Self Help     _____Other    Estimated Length of stay:_3___

## 2018-07-16 NOTE — CHART NOTE - NSCHARTNOTEFT_GEN_A_CORE
The patient was admitted to the inpt detox unit CDU, for   ETOH__x__ Opioid___  Benzo____Polysubstance _____ Dependency.    Pt was admitted from ED_x___, Intake____, Med/surg Floor_______.    Details are present in the preceding History & Physical section and follow up chart notes.  patient was evaluated on daily detox team  rounds.  Withdrawal symptoms and signs were reviewed on a daily basis, and the protocols were adjusted accordingly.    Labs and imaging results were reviewed and discussed with the patient.    All questions from the patient were addressed.  The patient was seen by the Chemical dependency counselors, and different options for after care were discussed.  The patient attended groups, meetings and 1:1 sessions with the counselors.  Narcane Kit was offered and instructions given prior to discharge.    Psychiatry consultation reviewed______, N/A__x____    Physical therapy evaluation reviewed_____, N/A_x___    Pt was given copies of labs and imaging reports, if applicable.    Prescriptions if needed, were sent through [facility] system to the pharmacy amnd are noted in the discharge instruction sheet.    After care was arranged by counselors and pt was discharged to:    Home_x_, Outpt. Program___, Rehab ___, Long term____ [facility] ____ [facility]____ SNF____, AMA___, Admin Discharge____    Principal Diagnosis: Alcohol Dependency__x__ Opioid Dependency___ Benzo Dependency____ Polysubstance Dependency____

## 2018-07-17 RX ORDER — NICOTINE POLACRILEX 2 MG
1 GUM BUCCAL DAILY
Qty: 0 | Refills: 0 | Status: DISCONTINUED | OUTPATIENT
Start: 2018-07-17 | End: 2018-08-07

## 2018-07-17 RX ADMIN — PANTOPRAZOLE SODIUM 40 MILLIGRAM(S): 20 TABLET, DELAYED RELEASE ORAL at 09:08

## 2018-07-17 RX ADMIN — Medication 100 MILLIGRAM(S): at 09:08

## 2018-07-17 RX ADMIN — QUETIAPINE FUMARATE 200 MILLIGRAM(S): 200 TABLET, FILM COATED ORAL at 21:00

## 2018-07-17 RX ADMIN — Medication 1 TABLET(S): at 09:08

## 2018-07-18 LAB
APPEARANCE UR: CLEAR — SIGNIFICANT CHANGE UP
BILIRUB UR-MCNC: NEGATIVE — SIGNIFICANT CHANGE UP
COLOR SPEC: YELLOW — SIGNIFICANT CHANGE UP
DIFF PNL FLD: NEGATIVE — SIGNIFICANT CHANGE UP
GLUCOSE UR QL: NEGATIVE MG/DL — SIGNIFICANT CHANGE UP
KETONES UR-MCNC: NEGATIVE — SIGNIFICANT CHANGE UP
LEUKOCYTE ESTERASE UR-ACNC: NEGATIVE — SIGNIFICANT CHANGE UP
NITRITE UR-MCNC: NEGATIVE — SIGNIFICANT CHANGE UP
PH UR: 7 — SIGNIFICANT CHANGE UP (ref 5–8)
PROT UR-MCNC: NEGATIVE MG/DL — SIGNIFICANT CHANGE UP
SP GR SPEC: 1.01 — SIGNIFICANT CHANGE UP (ref 1.01–1.03)
UROBILINOGEN FLD QL: 0.2 MG/DL — SIGNIFICANT CHANGE UP (ref 0.2–0.2)

## 2018-07-18 RX ADMIN — Medication 100 MILLIGRAM(S): at 08:49

## 2018-07-18 RX ADMIN — Medication 1 PATCH: at 08:48

## 2018-07-18 RX ADMIN — Medication 1 TABLET(S): at 08:49

## 2018-07-18 RX ADMIN — QUETIAPINE FUMARATE 200 MILLIGRAM(S): 200 TABLET, FILM COATED ORAL at 21:12

## 2018-07-18 RX ADMIN — PANTOPRAZOLE SODIUM 40 MILLIGRAM(S): 20 TABLET, DELAYED RELEASE ORAL at 08:49

## 2018-07-19 LAB
CULTURE RESULTS: NO GROWTH — SIGNIFICANT CHANGE UP
SPECIMEN SOURCE: SIGNIFICANT CHANGE UP

## 2018-07-19 RX ORDER — BUPRENORPHINE AND NALOXONE 2; .5 MG/1; MG/1
1 TABLET SUBLINGUAL DAILY
Qty: 0 | Refills: 0 | Status: DISCONTINUED | OUTPATIENT
Start: 2018-07-19 | End: 2018-07-26

## 2018-07-19 RX ORDER — BUPRENORPHINE AND NALOXONE 2; .5 MG/1; MG/1
1 TABLET SUBLINGUAL ONCE
Qty: 0 | Refills: 0 | Status: DISCONTINUED | OUTPATIENT
Start: 2018-07-19 | End: 2018-08-07

## 2018-07-19 RX ADMIN — Medication 1 PATCH: at 09:55

## 2018-07-19 RX ADMIN — PANTOPRAZOLE SODIUM 40 MILLIGRAM(S): 20 TABLET, DELAYED RELEASE ORAL at 09:55

## 2018-07-19 RX ADMIN — QUETIAPINE FUMARATE 200 MILLIGRAM(S): 200 TABLET, FILM COATED ORAL at 21:29

## 2018-07-19 RX ADMIN — Medication 100 MILLIGRAM(S): at 09:55

## 2018-07-19 RX ADMIN — Medication 1 TABLET(S): at 09:55

## 2018-07-19 RX ADMIN — BUPRENORPHINE AND NALOXONE 1 TABLET(S): 2; .5 TABLET SUBLINGUAL at 09:56

## 2018-07-20 RX ADMIN — BUPRENORPHINE AND NALOXONE 1 TABLET(S): 2; .5 TABLET SUBLINGUAL at 08:49

## 2018-07-20 RX ADMIN — Medication 1 TABLET(S): at 08:49

## 2018-07-20 RX ADMIN — QUETIAPINE FUMARATE 200 MILLIGRAM(S): 200 TABLET, FILM COATED ORAL at 21:22

## 2018-07-20 RX ADMIN — Medication 100 MILLIGRAM(S): at 08:49

## 2018-07-20 RX ADMIN — PANTOPRAZOLE SODIUM 40 MILLIGRAM(S): 20 TABLET, DELAYED RELEASE ORAL at 08:48

## 2018-07-20 RX ADMIN — Medication 1 PATCH: at 08:48

## 2018-07-20 RX ADMIN — Medication 1 PATCH: at 08:51

## 2018-07-21 RX ADMIN — Medication 1 TABLET(S): at 08:47

## 2018-07-21 RX ADMIN — Medication 100 MILLIGRAM(S): at 08:47

## 2018-07-21 RX ADMIN — PANTOPRAZOLE SODIUM 40 MILLIGRAM(S): 20 TABLET, DELAYED RELEASE ORAL at 08:47

## 2018-07-21 RX ADMIN — BUPRENORPHINE AND NALOXONE 1 TABLET(S): 2; .5 TABLET SUBLINGUAL at 08:48

## 2018-07-21 RX ADMIN — Medication 1 PATCH: at 08:46

## 2018-07-21 RX ADMIN — QUETIAPINE FUMARATE 200 MILLIGRAM(S): 200 TABLET, FILM COATED ORAL at 21:35

## 2018-07-22 RX ADMIN — BUPRENORPHINE AND NALOXONE 1 TABLET(S): 2; .5 TABLET SUBLINGUAL at 08:30

## 2018-07-22 RX ADMIN — Medication 1 PATCH: at 08:29

## 2018-07-22 RX ADMIN — Medication 100 MILLIGRAM(S): at 08:29

## 2018-07-22 RX ADMIN — QUETIAPINE FUMARATE 200 MILLIGRAM(S): 200 TABLET, FILM COATED ORAL at 21:07

## 2018-07-22 RX ADMIN — PANTOPRAZOLE SODIUM 40 MILLIGRAM(S): 20 TABLET, DELAYED RELEASE ORAL at 08:29

## 2018-07-22 RX ADMIN — Medication 1 TABLET(S): at 08:29

## 2018-07-23 DIAGNOSIS — M54.9 DORSALGIA, UNSPECIFIED: ICD-10-CM
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
## 2018-07-23 DIAGNOSIS — F32.89 OTHER SPECIFIED DEPRESSIVE EPISODES: ICD-10-CM

## 2018-07-23 DIAGNOSIS — F17.200 NICOTINE DEPENDENCE, UNSPECIFIED, UNCOMPLICATED: ICD-10-CM

## 2018-07-23 DIAGNOSIS — G47.00 INSOMNIA, UNSPECIFIED: ICD-10-CM

## 2018-07-23 DIAGNOSIS — F41.9 ANXIETY DISORDER, UNSPECIFIED: ICD-10-CM

## 2018-07-23 DIAGNOSIS — G89.29 OTHER CHRONIC PAIN: ICD-10-CM

## 2018-07-23 DIAGNOSIS — F14.20 COCAINE DEPENDENCE, UNCOMPLICATED: ICD-10-CM

## 2018-07-23 DIAGNOSIS — F10.20 ALCOHOL DEPENDENCE, UNCOMPLICATED: ICD-10-CM

## 2018-07-23 DIAGNOSIS — F10.282 ALCOHOL DEPENDENCE WITH ALCOHOL-INDUCED SLEEP DISORDER: ICD-10-CM

## 2018-07-23 RX ADMIN — BUPRENORPHINE AND NALOXONE 1 TABLET(S): 2; .5 TABLET SUBLINGUAL at 08:10

## 2018-07-23 RX ADMIN — Medication 1 TABLET(S): at 08:08

## 2018-07-23 RX ADMIN — Medication 1 PATCH: at 08:10

## 2018-07-23 RX ADMIN — QUETIAPINE FUMARATE 200 MILLIGRAM(S): 200 TABLET, FILM COATED ORAL at 21:05

## 2018-07-23 RX ADMIN — Medication 1 PATCH: at 08:11

## 2018-07-23 RX ADMIN — Medication 100 MILLIGRAM(S): at 08:08

## 2018-07-23 RX ADMIN — PANTOPRAZOLE SODIUM 40 MILLIGRAM(S): 20 TABLET, DELAYED RELEASE ORAL at 08:08

## 2018-07-24 RX ORDER — FLUOXETINE HCL 10 MG
10 CAPSULE ORAL DAILY
Qty: 0 | Refills: 0 | Status: COMPLETED | OUTPATIENT
Start: 2018-07-24 | End: 2018-07-26

## 2018-07-24 RX ORDER — SALICYLIC ACID 0.5 %
1 CLEANSER (GRAM) TOPICAL DAILY
Qty: 0 | Refills: 0 | Status: DISCONTINUED | OUTPATIENT
Start: 2018-07-24 | End: 2018-08-07

## 2018-07-24 RX ORDER — FLUOXETINE HCL 10 MG
20 CAPSULE ORAL DAILY
Qty: 0 | Refills: 0 | Status: DISCONTINUED | OUTPATIENT
Start: 2018-07-27 | End: 2018-08-02

## 2018-07-24 RX ADMIN — Medication 1 PATCH: at 08:24

## 2018-07-24 RX ADMIN — QUETIAPINE FUMARATE 200 MILLIGRAM(S): 200 TABLET, FILM COATED ORAL at 21:29

## 2018-07-24 RX ADMIN — BUPRENORPHINE AND NALOXONE 1 TABLET(S): 2; .5 TABLET SUBLINGUAL at 08:25

## 2018-07-24 RX ADMIN — Medication 1 TABLET(S): at 08:24

## 2018-07-24 RX ADMIN — PANTOPRAZOLE SODIUM 40 MILLIGRAM(S): 20 TABLET, DELAYED RELEASE ORAL at 08:24

## 2018-07-24 RX ADMIN — Medication 100 MILLIGRAM(S): at 08:24

## 2018-07-24 RX ADMIN — Medication 1 APPLICATION(S): at 12:30

## 2018-07-25 RX ADMIN — Medication 100 MILLIGRAM(S): at 08:05

## 2018-07-25 RX ADMIN — Medication 1 TABLET(S): at 08:05

## 2018-07-25 RX ADMIN — Medication 1 PATCH: at 08:07

## 2018-07-25 RX ADMIN — Medication 1 PATCH: at 08:05

## 2018-07-25 RX ADMIN — QUETIAPINE FUMARATE 200 MILLIGRAM(S): 200 TABLET, FILM COATED ORAL at 21:22

## 2018-07-25 RX ADMIN — BUPRENORPHINE AND NALOXONE 1 TABLET(S): 2; .5 TABLET SUBLINGUAL at 08:04

## 2018-07-25 RX ADMIN — PANTOPRAZOLE SODIUM 40 MILLIGRAM(S): 20 TABLET, DELAYED RELEASE ORAL at 08:05

## 2018-07-25 RX ADMIN — Medication 10 MILLIGRAM(S): at 08:05

## 2018-07-26 RX ORDER — BUPRENORPHINE AND NALOXONE 2; .5 MG/1; MG/1
1 TABLET SUBLINGUAL DAILY
Qty: 0 | Refills: 0 | Status: DISCONTINUED | OUTPATIENT
Start: 2018-07-27 | End: 2018-08-03

## 2018-07-26 RX ADMIN — Medication 100 MILLIGRAM(S): at 08:37

## 2018-07-26 RX ADMIN — Medication 1 PATCH: at 08:37

## 2018-07-26 RX ADMIN — Medication 1 TABLET(S): at 08:37

## 2018-07-26 RX ADMIN — Medication 10 MILLIGRAM(S): at 08:37

## 2018-07-26 RX ADMIN — BUPRENORPHINE AND NALOXONE 1 TABLET(S): 2; .5 TABLET SUBLINGUAL at 08:40

## 2018-07-26 RX ADMIN — PANTOPRAZOLE SODIUM 40 MILLIGRAM(S): 20 TABLET, DELAYED RELEASE ORAL at 08:37

## 2018-07-26 RX ADMIN — QUETIAPINE FUMARATE 200 MILLIGRAM(S): 200 TABLET, FILM COATED ORAL at 21:16

## 2018-07-27 RX ADMIN — Medication 1 PATCH: at 08:33

## 2018-07-27 RX ADMIN — Medication 20 MILLIGRAM(S): at 08:34

## 2018-07-27 RX ADMIN — PANTOPRAZOLE SODIUM 40 MILLIGRAM(S): 20 TABLET, DELAYED RELEASE ORAL at 08:34

## 2018-07-27 RX ADMIN — BUPRENORPHINE AND NALOXONE 1 TABLET(S): 2; .5 TABLET SUBLINGUAL at 08:35

## 2018-07-27 RX ADMIN — Medication 1 TABLET(S): at 08:34

## 2018-07-27 RX ADMIN — QUETIAPINE FUMARATE 200 MILLIGRAM(S): 200 TABLET, FILM COATED ORAL at 21:26

## 2018-07-27 RX ADMIN — Medication 100 MILLIGRAM(S): at 08:34

## 2018-07-28 RX ADMIN — Medication 20 MILLIGRAM(S): at 08:32

## 2018-07-28 RX ADMIN — Medication 1 PATCH: at 08:32

## 2018-07-28 RX ADMIN — Medication 100 MILLIGRAM(S): at 08:32

## 2018-07-28 RX ADMIN — BUPRENORPHINE AND NALOXONE 1 TABLET(S): 2; .5 TABLET SUBLINGUAL at 08:34

## 2018-07-28 RX ADMIN — QUETIAPINE FUMARATE 200 MILLIGRAM(S): 200 TABLET, FILM COATED ORAL at 21:19

## 2018-07-28 RX ADMIN — Medication 1 TABLET(S): at 08:32

## 2018-07-28 RX ADMIN — PANTOPRAZOLE SODIUM 40 MILLIGRAM(S): 20 TABLET, DELAYED RELEASE ORAL at 08:32

## 2018-07-29 RX ADMIN — Medication 1 PATCH: at 08:44

## 2018-07-29 RX ADMIN — QUETIAPINE FUMARATE 200 MILLIGRAM(S): 200 TABLET, FILM COATED ORAL at 21:18

## 2018-07-29 RX ADMIN — BUPRENORPHINE AND NALOXONE 1 TABLET(S): 2; .5 TABLET SUBLINGUAL at 08:43

## 2018-07-29 RX ADMIN — Medication 100 MILLIGRAM(S): at 08:42

## 2018-07-29 RX ADMIN — Medication 1 PATCH: at 08:42

## 2018-07-29 RX ADMIN — Medication 1 TABLET(S): at 08:42

## 2018-07-29 RX ADMIN — Medication 20 MILLIGRAM(S): at 08:42

## 2018-07-29 RX ADMIN — PANTOPRAZOLE SODIUM 40 MILLIGRAM(S): 20 TABLET, DELAYED RELEASE ORAL at 08:42

## 2018-07-30 RX ADMIN — PANTOPRAZOLE SODIUM 40 MILLIGRAM(S): 20 TABLET, DELAYED RELEASE ORAL at 08:30

## 2018-07-30 RX ADMIN — BUPRENORPHINE AND NALOXONE 1 TABLET(S): 2; .5 TABLET SUBLINGUAL at 08:31

## 2018-07-30 RX ADMIN — QUETIAPINE FUMARATE 200 MILLIGRAM(S): 200 TABLET, FILM COATED ORAL at 21:28

## 2018-07-30 RX ADMIN — Medication 20 MILLIGRAM(S): at 08:30

## 2018-07-30 RX ADMIN — Medication 100 MILLIGRAM(S): at 08:30

## 2018-07-30 RX ADMIN — Medication 1 TABLET(S): at 08:30

## 2018-07-30 RX ADMIN — Medication 1 PATCH: at 08:30

## 2018-07-31 RX ADMIN — QUETIAPINE FUMARATE 200 MILLIGRAM(S): 200 TABLET, FILM COATED ORAL at 21:00

## 2018-07-31 RX ADMIN — BUPRENORPHINE AND NALOXONE 1 TABLET(S): 2; .5 TABLET SUBLINGUAL at 08:21

## 2018-07-31 RX ADMIN — Medication 1 PATCH: at 08:23

## 2018-07-31 RX ADMIN — Medication 1 APPLICATION(S): at 15:35

## 2018-07-31 RX ADMIN — PANTOPRAZOLE SODIUM 40 MILLIGRAM(S): 20 TABLET, DELAYED RELEASE ORAL at 08:22

## 2018-07-31 RX ADMIN — Medication 20 MILLIGRAM(S): at 08:22

## 2018-07-31 RX ADMIN — Medication 1 PATCH: at 08:21

## 2018-07-31 RX ADMIN — Medication 100 MILLIGRAM(S): at 08:22

## 2018-07-31 RX ADMIN — Medication 1 TABLET(S): at 08:22

## 2018-08-01 RX ADMIN — Medication 1 TABLET(S): at 08:29

## 2018-08-01 RX ADMIN — Medication 100 MILLIGRAM(S): at 08:29

## 2018-08-01 RX ADMIN — Medication 20 MILLIGRAM(S): at 08:30

## 2018-08-01 RX ADMIN — Medication 1 PATCH: at 08:30

## 2018-08-01 RX ADMIN — BUPRENORPHINE AND NALOXONE 1 TABLET(S): 2; .5 TABLET SUBLINGUAL at 08:29

## 2018-08-01 RX ADMIN — PANTOPRAZOLE SODIUM 40 MILLIGRAM(S): 20 TABLET, DELAYED RELEASE ORAL at 08:30

## 2018-08-01 RX ADMIN — QUETIAPINE FUMARATE 200 MILLIGRAM(S): 200 TABLET, FILM COATED ORAL at 21:10

## 2018-08-01 RX ADMIN — Medication 1 PATCH: at 08:31

## 2018-08-02 RX ORDER — FLUOXETINE HCL 10 MG
40 CAPSULE ORAL DAILY
Qty: 0 | Refills: 0 | Status: DISCONTINUED | OUTPATIENT
Start: 2018-08-02 | End: 2018-08-07

## 2018-08-02 RX ADMIN — Medication 100 MILLIGRAM(S): at 08:27

## 2018-08-02 RX ADMIN — Medication 1 TABLET(S): at 08:27

## 2018-08-02 RX ADMIN — PANTOPRAZOLE SODIUM 40 MILLIGRAM(S): 20 TABLET, DELAYED RELEASE ORAL at 08:27

## 2018-08-02 RX ADMIN — QUETIAPINE FUMARATE 200 MILLIGRAM(S): 200 TABLET, FILM COATED ORAL at 21:16

## 2018-08-02 RX ADMIN — BUPRENORPHINE AND NALOXONE 1 TABLET(S): 2; .5 TABLET SUBLINGUAL at 08:28

## 2018-08-02 RX ADMIN — Medication 20 MILLIGRAM(S): at 08:27

## 2018-08-02 RX ADMIN — Medication 1 PATCH: at 08:27

## 2018-08-02 RX ADMIN — Medication 1 PATCH: at 08:29

## 2018-08-03 RX ORDER — BUPRENORPHINE AND NALOXONE 2; .5 MG/1; MG/1
1 TABLET SUBLINGUAL DAILY
Qty: 0 | Refills: 0 | Status: DISCONTINUED | OUTPATIENT
Start: 2018-08-03 | End: 2018-08-07

## 2018-08-03 RX ADMIN — QUETIAPINE FUMARATE 200 MILLIGRAM(S): 200 TABLET, FILM COATED ORAL at 21:39

## 2018-08-03 RX ADMIN — Medication 1 APPLICATION(S): at 21:39

## 2018-08-03 RX ADMIN — Medication 1 PATCH: at 08:29

## 2018-08-03 RX ADMIN — Medication 100 MILLIGRAM(S): at 08:29

## 2018-08-03 RX ADMIN — Medication 40 MILLIGRAM(S): at 08:29

## 2018-08-03 RX ADMIN — Medication 1 TABLET(S): at 08:29

## 2018-08-03 RX ADMIN — PANTOPRAZOLE SODIUM 40 MILLIGRAM(S): 20 TABLET, DELAYED RELEASE ORAL at 08:29

## 2018-08-03 RX ADMIN — BUPRENORPHINE AND NALOXONE 1 TABLET(S): 2; .5 TABLET SUBLINGUAL at 08:31

## 2018-08-04 RX ADMIN — Medication 40 MILLIGRAM(S): at 08:29

## 2018-08-04 RX ADMIN — Medication 1 TABLET(S): at 08:28

## 2018-08-04 RX ADMIN — Medication 1 PATCH: at 08:29

## 2018-08-04 RX ADMIN — QUETIAPINE FUMARATE 200 MILLIGRAM(S): 200 TABLET, FILM COATED ORAL at 22:18

## 2018-08-04 RX ADMIN — Medication 100 MILLIGRAM(S): at 08:28

## 2018-08-04 RX ADMIN — BUPRENORPHINE AND NALOXONE 1 TABLET(S): 2; .5 TABLET SUBLINGUAL at 08:28

## 2018-08-04 RX ADMIN — Medication 400 MILLIGRAM(S): at 20:41

## 2018-08-04 RX ADMIN — Medication 400 MILLIGRAM(S): at 22:15

## 2018-08-04 RX ADMIN — PANTOPRAZOLE SODIUM 40 MILLIGRAM(S): 20 TABLET, DELAYED RELEASE ORAL at 08:28

## 2018-08-05 RX ADMIN — Medication 40 MILLIGRAM(S): at 08:34

## 2018-08-05 RX ADMIN — Medication 1 PATCH: at 08:34

## 2018-08-05 RX ADMIN — Medication 1 TABLET(S): at 08:34

## 2018-08-05 RX ADMIN — QUETIAPINE FUMARATE 200 MILLIGRAM(S): 200 TABLET, FILM COATED ORAL at 21:17

## 2018-08-05 RX ADMIN — PANTOPRAZOLE SODIUM 40 MILLIGRAM(S): 20 TABLET, DELAYED RELEASE ORAL at 08:34

## 2018-08-05 RX ADMIN — Medication 1 APPLICATION(S): at 08:33

## 2018-08-05 RX ADMIN — BUPRENORPHINE AND NALOXONE 1 TABLET(S): 2; .5 TABLET SUBLINGUAL at 08:33

## 2018-08-05 RX ADMIN — Medication 1 PATCH: at 08:36

## 2018-08-05 RX ADMIN — Medication 100 MILLIGRAM(S): at 08:34

## 2018-08-06 RX ADMIN — Medication 40 MILLIGRAM(S): at 08:24

## 2018-08-06 RX ADMIN — QUETIAPINE FUMARATE 200 MILLIGRAM(S): 200 TABLET, FILM COATED ORAL at 20:53

## 2018-08-06 RX ADMIN — Medication 1 PATCH: at 08:26

## 2018-08-06 RX ADMIN — Medication 100 MILLIGRAM(S): at 08:24

## 2018-08-06 RX ADMIN — Medication 400 MILLIGRAM(S): at 09:52

## 2018-08-06 RX ADMIN — PANTOPRAZOLE SODIUM 40 MILLIGRAM(S): 20 TABLET, DELAYED RELEASE ORAL at 08:24

## 2018-08-06 RX ADMIN — Medication 400 MILLIGRAM(S): at 19:06

## 2018-08-06 RX ADMIN — Medication 1 TABLET(S): at 08:24

## 2018-08-06 RX ADMIN — Medication 1 PATCH: at 08:25

## 2018-08-06 RX ADMIN — BUPRENORPHINE AND NALOXONE 1 TABLET(S): 2; .5 TABLET SUBLINGUAL at 08:24

## 2018-08-06 RX ADMIN — Medication 400 MILLIGRAM(S): at 16:33

## 2018-08-07 VITALS — TEMPERATURE: 98 F

## 2018-08-07 RX ORDER — ACETAMINOPHEN 500 MG
650 TABLET ORAL EVERY 6 HOURS
Qty: 0 | Refills: 0 | Status: DISCONTINUED | OUTPATIENT
Start: 2018-08-07 | End: 2018-08-07

## 2018-08-07 RX ORDER — BUPRENORPHINE AND NALOXONE 2; .5 MG/1; MG/1
1 TABLET SUBLINGUAL
Qty: 0 | Refills: 0 | COMMUNITY
Start: 2018-08-07

## 2018-08-07 RX ORDER — QUETIAPINE FUMARATE 200 MG/1
1 TABLET, FILM COATED ORAL
Qty: 0 | Refills: 0 | COMMUNITY
Start: 2018-08-07

## 2018-08-07 RX ORDER — QUETIAPINE FUMARATE 200 MG/1
0 TABLET, FILM COATED ORAL
Qty: 0 | Refills: 0 | COMMUNITY

## 2018-08-07 RX ORDER — FLUOXETINE HCL 10 MG
1 CAPSULE ORAL
Qty: 0 | Refills: 0 | COMMUNITY
Start: 2018-08-07

## 2018-08-07 RX ORDER — ACETAMINOPHEN 500 MG
650 TABLET ORAL ONCE
Qty: 0 | Refills: 0 | Status: COMPLETED | OUTPATIENT
Start: 2018-08-07 | End: 2018-08-07

## 2018-08-07 RX ADMIN — Medication 400 MILLIGRAM(S): at 06:26

## 2018-08-07 RX ADMIN — Medication 40 MILLIGRAM(S): at 09:22

## 2018-08-07 RX ADMIN — Medication 650 MILLIGRAM(S): at 06:26

## 2018-08-07 RX ADMIN — Medication 100 MILLIGRAM(S): at 09:22

## 2018-08-07 RX ADMIN — BUPRENORPHINE AND NALOXONE 1 TABLET(S): 2; .5 TABLET SUBLINGUAL at 09:21

## 2018-08-07 RX ADMIN — Medication 1 TABLET(S): at 09:22

## 2018-08-07 RX ADMIN — PANTOPRAZOLE SODIUM 40 MILLIGRAM(S): 20 TABLET, DELAYED RELEASE ORAL at 09:22

## 2018-08-07 NOTE — CHART NOTE - NSCHARTNOTEFT_GEN_A_CORE
[facility]   [facility]__x__ Opioid___  Benzo____Polysubstance _____ Dependency.    Pt was admitted from CDU_x___, Intake____, Med/surg Floor_______.    Details are present in the preceding History & Physical section and follow up chart notes.  patient was evaluated on daily   rounds.  Withdrawal symptoms and signs were reviewed on a prn   basis, and the meds were adjusted accordingly.    Labs and imaging results were reviewed and discussed with the patient.    All questions from the patient were addressed.  The patient was seen by the Chemical dependency counselors, and different options for after care were discussed.  The patient attended groups, meetings and 1:1 sessions with the counselors.  Narcane Kit was offered and instructions given prior to discharge.    Psychiatry consultation reviewed___x___, N/A______    Physical therapy evaluation reviewed_____, N/A_x___    Pt was given copies of labs and imaging reports, if applicable.    Prescriptions if needed, were sent through [facility] system to the pharmacy amnd are noted in the discharge instruction sheet.    After care was arranged by counselors and pt was discharged to:    Home___, [facility] __x____Outpt. Program___, Rehab ___, Long term____ [facility] ____ [facility]____ SNF____, AMA___, Admin Discharge____    Principal Diagnosis: Alcohol Dependency__x_ Opioid Dependency___ Benzo Dependency____ Polysubstance Dependency____

## 2018-08-08 ENCOUNTER — INPATIENT (INPATIENT)
Facility: HOSPITAL | Age: 48
LOS: 2 days | Discharge: HOME | End: 2018-08-11
Attending: HOSPITALIST | Admitting: HOSPITALIST

## 2018-08-08 VITALS
TEMPERATURE: 102 F | SYSTOLIC BLOOD PRESSURE: 102 MMHG | HEART RATE: 98 BPM | OXYGEN SATURATION: 96 % | DIASTOLIC BLOOD PRESSURE: 58 MMHG | RESPIRATION RATE: 18 BRPM

## 2018-08-08 DIAGNOSIS — Z98.51 TUBAL LIGATION STATUS: Chronic | ICD-10-CM

## 2018-08-08 PROBLEM — F32.9 MAJOR DEPRESSIVE DISORDER, SINGLE EPISODE, UNSPECIFIED: Chronic | Status: ACTIVE | Noted: 2018-07-13

## 2018-08-08 PROBLEM — G47.00 INSOMNIA, UNSPECIFIED: Chronic | Status: ACTIVE | Noted: 2018-07-13

## 2018-08-08 PROBLEM — Z72.0 TOBACCO USE: Chronic | Status: ACTIVE | Noted: 2018-07-13

## 2018-08-08 LAB
ALBUMIN SERPL ELPH-MCNC: 3.8 G/DL — SIGNIFICANT CHANGE UP (ref 3.5–5.2)
ALP SERPL-CCNC: 80 U/L — SIGNIFICANT CHANGE UP (ref 30–115)
ALT FLD-CCNC: 116 U/L — HIGH (ref 0–41)
ANION GAP SERPL CALC-SCNC: 17 MMOL/L — HIGH (ref 7–14)
APPEARANCE UR: ABNORMAL
AST SERPL-CCNC: 57 U/L — HIGH (ref 0–41)
BACTERIA # UR AUTO: ABNORMAL /HPF
BASOPHILS # BLD AUTO: 0.01 K/UL — SIGNIFICANT CHANGE UP (ref 0–0.2)
BASOPHILS NFR BLD AUTO: 0.1 % — SIGNIFICANT CHANGE UP (ref 0–1)
BILIRUB SERPL-MCNC: 0.3 MG/DL — SIGNIFICANT CHANGE UP (ref 0.2–1.2)
BILIRUB UR-MCNC: NEGATIVE — SIGNIFICANT CHANGE UP
BUN SERPL-MCNC: 6 MG/DL — LOW (ref 10–20)
CALCIUM SERPL-MCNC: 8.5 MG/DL — SIGNIFICANT CHANGE UP (ref 8.5–10.1)
CHLORIDE SERPL-SCNC: 96 MMOL/L — LOW (ref 98–110)
CO2 SERPL-SCNC: 25 MMOL/L — SIGNIFICANT CHANGE UP (ref 17–32)
COLOR SPEC: ABNORMAL
CREAT SERPL-MCNC: 0.6 MG/DL — LOW (ref 0.7–1.5)
DIFF PNL FLD: NEGATIVE — SIGNIFICANT CHANGE UP
EOSINOPHIL # BLD AUTO: 0.11 K/UL — SIGNIFICANT CHANGE UP (ref 0–0.7)
EOSINOPHIL NFR BLD AUTO: 0.9 % — SIGNIFICANT CHANGE UP (ref 0–8)
EPI CELLS # UR: ABNORMAL /HPF
GLUCOSE SERPL-MCNC: 111 MG/DL — HIGH (ref 70–99)
GLUCOSE UR QL: NEGATIVE MG/DL — SIGNIFICANT CHANGE UP
HCT VFR BLD CALC: 28.1 % — LOW (ref 37–47)
HGB BLD-MCNC: 9.8 G/DL — LOW (ref 12–16)
IMM GRANULOCYTES NFR BLD AUTO: 0.5 % — HIGH (ref 0.1–0.3)
KETONES UR-MCNC: ABNORMAL
LACTATE BLDV-MCNC: 0.7 MMOL/L — SIGNIFICANT CHANGE UP (ref 0.5–1.6)
LEUKOCYTE ESTERASE UR-ACNC: ABNORMAL
LIDOCAIN IGE QN: 20 U/L — SIGNIFICANT CHANGE UP (ref 7–60)
LYMPHOCYTES # BLD AUTO: 1.3 K/UL — SIGNIFICANT CHANGE UP (ref 1.2–3.4)
LYMPHOCYTES # BLD AUTO: 11.2 % — LOW (ref 20.5–51.1)
MCHC RBC-ENTMCNC: 27.7 PG — SIGNIFICANT CHANGE UP (ref 27–31)
MCHC RBC-ENTMCNC: 34.9 G/DL — SIGNIFICANT CHANGE UP (ref 32–37)
MCV RBC AUTO: 79.4 FL — LOW (ref 81–99)
MONOCYTES # BLD AUTO: 1.49 K/UL — HIGH (ref 0.1–0.6)
MONOCYTES NFR BLD AUTO: 12.8 % — HIGH (ref 1.7–9.3)
NEUTROPHILS # BLD AUTO: 8.64 K/UL — HIGH (ref 1.4–6.5)
NEUTROPHILS NFR BLD AUTO: 74.5 % — SIGNIFICANT CHANGE UP (ref 42.2–75.2)
NITRITE UR-MCNC: POSITIVE
NRBC # BLD: 0 /100 WBCS — SIGNIFICANT CHANGE UP (ref 0–0)
PH UR: 6.5 — SIGNIFICANT CHANGE UP (ref 5–8)
PLATELET # BLD AUTO: 237 K/UL — SIGNIFICANT CHANGE UP (ref 130–400)
POTASSIUM SERPL-MCNC: 3.7 MMOL/L — SIGNIFICANT CHANGE UP (ref 3.5–5)
POTASSIUM SERPL-SCNC: 3.7 MMOL/L — SIGNIFICANT CHANGE UP (ref 3.5–5)
PROT SERPL-MCNC: 6.3 G/DL — SIGNIFICANT CHANGE UP (ref 6–8)
PROT UR-MCNC: 30 MG/DL
RBC # BLD: 3.54 M/UL — LOW (ref 4.2–5.4)
RBC # FLD: 14.5 % — SIGNIFICANT CHANGE UP (ref 11.5–14.5)
SODIUM SERPL-SCNC: 138 MMOL/L — SIGNIFICANT CHANGE UP (ref 135–146)
SP GR SPEC: 1.01 — SIGNIFICANT CHANGE UP (ref 1.01–1.03)
UROBILINOGEN FLD QL: 1 MG/DL (ref 0.2–0.2)
WBC # BLD: 11.61 K/UL — HIGH (ref 4.8–10.8)
WBC # FLD AUTO: 11.61 K/UL — HIGH (ref 4.8–10.8)
WBC UR QL: ABNORMAL /HPF

## 2018-08-08 RX ORDER — SODIUM CHLORIDE 9 MG/ML
1000 INJECTION INTRAMUSCULAR; INTRAVENOUS; SUBCUTANEOUS
Qty: 0 | Refills: 0 | Status: DISCONTINUED | OUTPATIENT
Start: 2018-08-08 | End: 2018-08-11

## 2018-08-08 RX ORDER — CEFTRIAXONE 500 MG/1
1 INJECTION, POWDER, FOR SOLUTION INTRAMUSCULAR; INTRAVENOUS ONCE
Qty: 0 | Refills: 0 | Status: COMPLETED | OUTPATIENT
Start: 2018-08-08 | End: 2018-08-08

## 2018-08-08 RX ORDER — IBUPROFEN 200 MG
400 TABLET ORAL ONCE
Qty: 0 | Refills: 0 | Status: COMPLETED | OUTPATIENT
Start: 2018-08-08 | End: 2018-08-08

## 2018-08-08 RX ORDER — SODIUM CHLORIDE 9 MG/ML
1000 INJECTION INTRAMUSCULAR; INTRAVENOUS; SUBCUTANEOUS ONCE
Qty: 0 | Refills: 0 | Status: COMPLETED | OUTPATIENT
Start: 2018-08-08 | End: 2018-08-08

## 2018-08-08 RX ADMIN — Medication 400 MILLIGRAM(S): at 14:48

## 2018-08-08 RX ADMIN — CEFTRIAXONE 100 GRAM(S): 500 INJECTION, POWDER, FOR SOLUTION INTRAMUSCULAR; INTRAVENOUS at 19:06

## 2018-08-08 RX ADMIN — SODIUM CHLORIDE 75 MILLILITER(S): 9 INJECTION INTRAMUSCULAR; INTRAVENOUS; SUBCUTANEOUS at 23:29

## 2018-08-08 RX ADMIN — SODIUM CHLORIDE 2000 MILLILITER(S): 9 INJECTION INTRAMUSCULAR; INTRAVENOUS; SUBCUTANEOUS at 14:48

## 2018-08-08 NOTE — ED ADULT NURSE NOTE - NSIMPLEMENTINTERV_GEN_ALL_ED
Implemented All Universal Safety Interventions:  [place] to call system. Call bell, personal items and telephone within reach. Instruct patient to call for assistance. Room bathroom lighting operational. Non-slip footwear when patient is off stretcher. Physically safe environment: no spills, clutter or unnecessary equipment. Stretcher in lowest position, wheels locked, appropriate side rails in place.

## 2018-08-08 NOTE — ED PROVIDER NOTE - OBJECTIVE STATEMENT
47 yo f hx recurrent UTI and pyelo. tx 4 weeks ago for UTI w/ unknown abx. pt went to UC yesterday  and had positive UA. pt was given 3 meds but is unsure. pt endorsing fever x yesterday. + chills, nausea, vomiting, myalgias, ha. pt states it feels the same as when she last had pyelo.  pt unsure why she gets recurrent UTI/pyelo.  Last episode of pyelo x1yr. +dysuria, increased freq, urgency. no flank pain.  No abdominal pain.

## 2018-08-08 NOTE — ED PROVIDER NOTE - MEDICAL DECISION MAKING DETAILS
49 y/o female with h/o uti's/pyelo in ER with c/o 2 day h/o fever/chills, dysuria/frequency, N/V, myalgias. went to  center yesterday, told she had UTI, given rx for abx, pt took 2 doses, but today with temp to 102 and was vomiting at home, so came to ER.  labs reviewed, + UTI, CT scan with R sided pyelo, to admit for iv abx.

## 2018-08-08 NOTE — ED PROVIDER NOTE - PHYSICAL EXAMINATION
PHYSICAL EXAM:    Constitutional: awake, alert, NAD  Eyes: old R fixed/dialated pupil 2/2 trauma, no conj injection  HENT: NC AT  Back: no c/t/l spine ttp  Respiratory: no respiratory distress, breath sounds equal b/l, no wheezing, rhonchi or stridor.   Cardiovascular: RRR nml S1S2  Gastrointestinal: soft, no masses, nontender, nondistended. no cvat  Extremities: no peripheral edema  Neurological: AAOx3, CN II-XII grossly intact, no focal numbness or weakness  Skin: no rash  Musculoskeletal: no gross deformity

## 2018-08-08 NOTE — ED PROVIDER NOTE - NS ED ROS FT
Constutional: pos fever, rigors  Eyes: no eye redness, acute visual change  ENMT: no ear pain, no throat pain  Card: no chest pain, no palpitations  Pulm: no cough, no shortness of breath  GI: no abdominal pain, + nausea and vomiting  : + dysuria, frequency, no hematuria  MSK: no limitation in range of motion, no neck pain  Skin: no rash, no abrasion  Neuro: no numbness, no weakness  Heme/Onc: no easy bruising, no bleeding tendency   Allergic: no hives, no throat swelling

## 2018-08-09 DIAGNOSIS — Z51.89 ENCOUNTER FOR OTHER SPECIFIED AFTERCARE: ICD-10-CM

## 2018-08-09 DIAGNOSIS — F17.210 NICOTINE DEPENDENCE, CIGARETTES, UNCOMPLICATED: ICD-10-CM

## 2018-08-09 DIAGNOSIS — M54.9 DORSALGIA, UNSPECIFIED: ICD-10-CM

## 2018-08-09 DIAGNOSIS — F14.20 COCAINE DEPENDENCE, UNCOMPLICATED: ICD-10-CM

## 2018-08-09 DIAGNOSIS — Z59.0 HOMELESSNESS: ICD-10-CM

## 2018-08-09 DIAGNOSIS — F32.9 MAJOR DEPRESSIVE DISORDER, SINGLE EPISODE, UNSPECIFIED: ICD-10-CM

## 2018-08-09 DIAGNOSIS — G89.29 OTHER CHRONIC PAIN: ICD-10-CM

## 2018-08-09 DIAGNOSIS — F10.20 ALCOHOL DEPENDENCE, UNCOMPLICATED: ICD-10-CM

## 2018-08-09 DIAGNOSIS — S09.93XD UNSPECIFIED INJURY OF FACE, SUBSEQUENT ENCOUNTER: Chronic | ICD-10-CM

## 2018-08-09 LAB
ALBUMIN SERPL ELPH-MCNC: 3.4 G/DL — LOW (ref 3.5–5.2)
ALP SERPL-CCNC: 81 U/L — SIGNIFICANT CHANGE UP (ref 30–115)
ALT FLD-CCNC: 103 U/L — HIGH (ref 0–41)
ANION GAP SERPL CALC-SCNC: 13 MMOL/L — SIGNIFICANT CHANGE UP (ref 7–14)
AST SERPL-CCNC: 47 U/L — HIGH (ref 0–41)
BASOPHILS # BLD AUTO: 0.03 K/UL — SIGNIFICANT CHANGE UP (ref 0–0.2)
BASOPHILS NFR BLD AUTO: 0.3 % — SIGNIFICANT CHANGE UP (ref 0–1)
BILIRUB SERPL-MCNC: 0.2 MG/DL — SIGNIFICANT CHANGE UP (ref 0.2–1.2)
BUN SERPL-MCNC: 7 MG/DL — LOW (ref 10–20)
CALCIUM SERPL-MCNC: 8.4 MG/DL — LOW (ref 8.5–10.1)
CHLORIDE SERPL-SCNC: 104 MMOL/L — SIGNIFICANT CHANGE UP (ref 98–110)
CO2 SERPL-SCNC: 28 MMOL/L — SIGNIFICANT CHANGE UP (ref 17–32)
CREAT SERPL-MCNC: 0.6 MG/DL — LOW (ref 0.7–1.5)
CULTURE RESULTS: NO GROWTH — SIGNIFICANT CHANGE UP
EOSINOPHIL # BLD AUTO: 0.41 K/UL — SIGNIFICANT CHANGE UP (ref 0–0.7)
EOSINOPHIL NFR BLD AUTO: 4 % — SIGNIFICANT CHANGE UP (ref 0–8)
GLUCOSE SERPL-MCNC: 84 MG/DL — SIGNIFICANT CHANGE UP (ref 70–99)
HCT VFR BLD CALC: 27.6 % — LOW (ref 37–47)
HGB BLD-MCNC: 9.3 G/DL — LOW (ref 12–16)
IMM GRANULOCYTES NFR BLD AUTO: 0.4 % — HIGH (ref 0.1–0.3)
LYMPHOCYTES # BLD AUTO: 2.2 K/UL — SIGNIFICANT CHANGE UP (ref 1.2–3.4)
LYMPHOCYTES # BLD AUTO: 21.2 % — SIGNIFICANT CHANGE UP (ref 20.5–51.1)
MAGNESIUM SERPL-MCNC: 2.1 MG/DL — SIGNIFICANT CHANGE UP (ref 1.8–2.4)
MCHC RBC-ENTMCNC: 26.7 PG — LOW (ref 27–31)
MCHC RBC-ENTMCNC: 33.7 G/DL — SIGNIFICANT CHANGE UP (ref 32–37)
MCV RBC AUTO: 79.3 FL — LOW (ref 81–99)
MONOCYTES # BLD AUTO: 1.56 K/UL — HIGH (ref 0.1–0.6)
MONOCYTES NFR BLD AUTO: 15 % — HIGH (ref 1.7–9.3)
NEUTROPHILS # BLD AUTO: 6.13 K/UL — SIGNIFICANT CHANGE UP (ref 1.4–6.5)
NEUTROPHILS NFR BLD AUTO: 59.1 % — SIGNIFICANT CHANGE UP (ref 42.2–75.2)
NRBC # BLD: 0 /100 WBCS — SIGNIFICANT CHANGE UP (ref 0–0)
PLATELET # BLD AUTO: 268 K/UL — SIGNIFICANT CHANGE UP (ref 130–400)
POTASSIUM SERPL-MCNC: 4.2 MMOL/L — SIGNIFICANT CHANGE UP (ref 3.5–5)
POTASSIUM SERPL-SCNC: 4.2 MMOL/L — SIGNIFICANT CHANGE UP (ref 3.5–5)
PROT SERPL-MCNC: 5.7 G/DL — LOW (ref 6–8)
RBC # BLD: 3.48 M/UL — LOW (ref 4.2–5.4)
RBC # FLD: 14.6 % — HIGH (ref 11.5–14.5)
SODIUM SERPL-SCNC: 145 MMOL/L — SIGNIFICANT CHANGE UP (ref 135–146)
SPECIMEN SOURCE: SIGNIFICANT CHANGE UP
WBC # BLD: 10.37 K/UL — SIGNIFICANT CHANGE UP (ref 4.8–10.8)
WBC # FLD AUTO: 10.37 K/UL — SIGNIFICANT CHANGE UP (ref 4.8–10.8)

## 2018-08-09 RX ORDER — ONDANSETRON 8 MG/1
4 TABLET, FILM COATED ORAL EVERY 6 HOURS
Qty: 0 | Refills: 0 | Status: DISCONTINUED | OUTPATIENT
Start: 2018-08-09 | End: 2018-08-11

## 2018-08-09 RX ORDER — QUETIAPINE FUMARATE 200 MG/1
200 TABLET, FILM COATED ORAL AT BEDTIME
Qty: 0 | Refills: 0 | Status: DISCONTINUED | OUTPATIENT
Start: 2018-08-09 | End: 2018-08-11

## 2018-08-09 RX ORDER — MEROPENEM 1 G/30ML
INJECTION INTRAVENOUS
Qty: 0 | Refills: 0 | Status: DISCONTINUED | OUTPATIENT
Start: 2018-08-09 | End: 2018-08-10

## 2018-08-09 RX ORDER — BUPRENORPHINE AND NALOXONE 2; .5 MG/1; MG/1
1 TABLET SUBLINGUAL DAILY
Qty: 0 | Refills: 0 | Status: DISCONTINUED | OUTPATIENT
Start: 2018-08-09 | End: 2018-08-11

## 2018-08-09 RX ORDER — ENOXAPARIN SODIUM 100 MG/ML
30 INJECTION SUBCUTANEOUS DAILY
Qty: 0 | Refills: 0 | Status: DISCONTINUED | OUTPATIENT
Start: 2018-08-09 | End: 2018-08-11

## 2018-08-09 RX ORDER — ACETAMINOPHEN 500 MG
650 TABLET ORAL EVERY 6 HOURS
Qty: 0 | Refills: 0 | Status: DISCONTINUED | OUTPATIENT
Start: 2018-08-09 | End: 2018-08-11

## 2018-08-09 RX ORDER — MEROPENEM 1 G/30ML
1000 INJECTION INTRAVENOUS ONCE
Qty: 0 | Refills: 0 | Status: COMPLETED | OUTPATIENT
Start: 2018-08-09 | End: 2018-08-09

## 2018-08-09 RX ORDER — MEROPENEM 1 G/30ML
1000 INJECTION INTRAVENOUS EVERY 8 HOURS
Qty: 0 | Refills: 0 | Status: DISCONTINUED | OUTPATIENT
Start: 2018-08-09 | End: 2018-08-10

## 2018-08-09 RX ORDER — FLUOXETINE HCL 10 MG
40 CAPSULE ORAL DAILY
Qty: 0 | Refills: 0 | Status: DISCONTINUED | OUTPATIENT
Start: 2018-08-09 | End: 2018-08-11

## 2018-08-09 RX ADMIN — SODIUM CHLORIDE 75 MILLILITER(S): 9 INJECTION INTRAMUSCULAR; INTRAVENOUS; SUBCUTANEOUS at 20:20

## 2018-08-09 RX ADMIN — QUETIAPINE FUMARATE 200 MILLIGRAM(S): 200 TABLET, FILM COATED ORAL at 22:01

## 2018-08-09 RX ADMIN — MEROPENEM 100 MILLIGRAM(S): 1 INJECTION INTRAVENOUS at 22:01

## 2018-08-09 RX ADMIN — BUPRENORPHINE AND NALOXONE 1 TABLET(S): 2; .5 TABLET SUBLINGUAL at 11:54

## 2018-08-09 RX ADMIN — Medication 40 MILLIGRAM(S): at 11:54

## 2018-08-09 RX ADMIN — MEROPENEM 100 MILLIGRAM(S): 1 INJECTION INTRAVENOUS at 01:30

## 2018-08-09 RX ADMIN — MEROPENEM 100 MILLIGRAM(S): 1 INJECTION INTRAVENOUS at 06:09

## 2018-08-09 RX ADMIN — Medication 650 MILLIGRAM(S): at 01:37

## 2018-08-09 RX ADMIN — Medication 650 MILLIGRAM(S): at 20:19

## 2018-08-09 RX ADMIN — MEROPENEM 100 MILLIGRAM(S): 1 INJECTION INTRAVENOUS at 15:19

## 2018-08-09 SDOH — ECONOMIC STABILITY - HOUSING INSECURITY: HOMELESSNESS: Z59.0

## 2018-08-09 NOTE — H&P ADULT - NSHPPHYSICALEXAM_GEN_ALL_CORE
Vital Signs Last 24 Hrs  T(C): 37 (08 Aug 2018 20:30), Max: 38.8 (08 Aug 2018 13:16)  T(F): 98.6 (08 Aug 2018 20:30), Max: 101.8 (08 Aug 2018 13:16)  HR: 83 (08 Aug 2018 20:30) (83 - 98)  BP: 99/56 (08 Aug 2018 20:30) (99/56 - 102/58)  BP(mean): --  RR: 18 (08 Aug 2018 20:30) (18 - 18)  SpO2: 97% (08 Aug 2018 20:30) (96% - 97%)    Constitutional: lying comfortably, NAD  HEENT: old R fixed/dialated pupil 2/2 trauma, NCAT, EOMI, anicteric sclera, conjunctiva clear, neck spple, trachea midline  Cardiac: S1 S2, RRR, no mirmurs  Lungs: no respiratory distress, GBAE, CTAB, no crackles, no wheezing  Abdomen: soft, no masses, NTND. no CVAT b/l  Extremities: +2 pp b/l, -ve LLE b/l  Skin: No rash  Neuro: AAOx3, CN II-XII grossly intact, no focal numbness or weakness

## 2018-08-09 NOTE — H&P ADULT - HISTORY OF PRESENT ILLNESS
48 year ol female with a past medical history of recurrent cystitis and pyelonephritis, Depression, Cocaine abuse, Insomnia presenting for fever and dysuria.    ED: Ceftriaxone 1g, NS 1L, Ibuprofen 400 48 year old female with a past medical history of recurrent cystitis and pyelonephritis, Depression, Cocaine abuse, Insomnia presenting for fever, increased urinary frequency and foul smelling urine. Patient reports that 2-3 days ago she started experiencing generalized pain more specifically the lower back associated with aches, fevers, chills and weakness. She reports that 3 weeks prior she had a urinary tract infection for which she received bactrim twice a day for 10 days with resolution in symptoms. 2-3 days ago she started experiencing chills, fevers, body aches, generalized weakness, constant headache which she though was the flu. She visited the urgent care yesterday, for which she was given oseltamivir and ciprofloxacin. Patient reports that she vomited today, 2 episodes, non bloody non bilious, food content, associated with retching. She compares her symptoms to her prior urinary tract infection. Patient reports decreased PO intake, mild abdominal pain from vomiting, increased urinary frequency, strong malodorous urine. Patient reports 7 years ago she was in [place], had a urinary tract infection as well for which she received IV antibiotics.  Patient denies cough, nasal congestion, sinus tenderness, rhinorrhea. Denies loss of weight, dysuria.  ED: Ceftriaxone 1g, NS 1L, Ibuprofen 400 48 year old female with a past medical history of recurrent cystitis and pyelonephritis, Depression, Cocaine abuse, Insomnia presenting for fever, increased urinary frequency and foul smelling urine. Patient reports that 2-3 days ago she started experiencing generalized pain more specifically the lower back associated with aches, fevers, chills and weakness. She reports that 3 weeks prior she had a urinary tract infection for which she received bactrim twice a day for 10 days with resolution in symptoms. 2-3 days ago she started experiencing chills, fevers, body aches, generalized weakness, constant headache which she though was the flu. She visited the urgent care yesterday, for which she was given oseltamivir and ciprofloxacin. Patient reports that she vomited today, 2 episodes, non bloody non bilious, food content, associated with retching. She compares her symptoms to her prior urinary tract infection. Patient reports decreased PO intake, mild abdominal pain from vomiting, increased urinary frequency, strong malodorous urine. Patient reports 7 years ago she was in [place], had a urinary tract infection as well for which she received IV antibiotics.  Patient denies cough, nasal congestion, sinus tenderness, rhinorrhea. Denies loss of weight, dysuria.  Note: Urine cx July 13th showed E.coli sensitive to Ceftriaxone  ED: Ceftriaxone 1g, NS 1L, Ibuprofen 400

## 2018-08-09 NOTE — H&P ADULT - NSHPLABSRESULTS_GEN_ALL_CORE
Labs                          9.8    11.61 )-----------( 237      ( 08 Aug 2018 14:30 )             28.1         138  |  96<L>  |  6<L>  ----------------------------<  111<H>  3.7   |  25  |  0.6<L>    Ca    8.5      08 Aug 2018 14:30    TPro  6.3  /  Alb  3.8  /  TBili  0.3  /  DBili  x   /  AST  57<H>  /  ALT  116<H>  /  AlkPhos  80      Blood Gas Venous - Lactate (18 @ 14:34)    Blood Gas Venous - Lactate: 0.7 mmoL/L    Urinalysis Basic - ( 08 Aug 2018 16:05 )  Color: Orange / Appearance: Cloudy / S.015 / pH: x  Gluc: x / Ketone: Trace  / Bili: Negative / Urobili: 1.0 mg/dL   Blood: x / Protein: 30 mg/dL / Nitrite: Positive   Leuk Esterase: Small / RBC: x / WBC 10-25 /HPF   Sq Epi: x / Non Sq Epi: Few /HPF / Bacteria: Many /HPF      Radiology  < from: CT Abdomen and Pelvis w/ IV Cont (18 @ 20:30) >  Right urothelial thickening with perinephric and periureteral inflammation and decreased enhancement of the right lower pole compatible with ascending infection/pyelonephritis in the setting of UTI and fever. No hydronephrosis or obstructing calculus.  < end of copied text >

## 2018-08-09 NOTE — H&P ADULT - ASSESSMENT
Sepsis secondary to Right pyelonephritis  - status post Ceftriaxone 1g and NS 1L bolus  - Currently hemodynamically stable  - Start Meropenem 1g IV every 8 hours because there is a risk of MDR GN infection in the setting of recent use of 3 different antibiotics (fluoroquinolone, TMP-SMX, broad spec beta lactam 3rd or 4th gen cep). De-escalate depending on urine cx.  - IVF NS 75cc/hr  - F/u blood and urine cx  - No need for urology; no hydronephrosis or obstructing calculus and no intervention in acute setting. Follow up as outpatient for possible cystoscopy.    Depression; bupropion, fluoxetine, sertraline    DVT ppx  Diet, 48 year old female with a past medical history of recurrent cystitis and pyelonephritis, Depression, Cocaine abuse, Insomnia presenting for fever, increased urinary frequency and foul smelling urine.    Sepsis secondary to Right pyelonephritis  - status post Ceftriaxone 1g and NS 1L bolus  - Currently hemodynamically stable, no longer tachycardic, afebrile  - Start Meropenem 1g IV every 8 hours because there is a risk of MDR GN infection in the setting of recent use of bactrim (fluoroquinolone, TMP-SMX, broad spec beta lactam 3rd or 4th gen [facility]). De-escalate depending on urine cx.  - IVF NS 75cc/hr  - F/u blood and urine cx  - No need for urology; no hydronephrosis or obstructing calculus and no intervention in acute setting. Follow up as outpatient for possible cystoscopy.  - Zofran 4mg prn    Headache; Tylenol prn    Depression; bupropion, fluoxetine, sertraline    DVT ppx; Lovenox 30  Diet, regular  Full Code 48 year old female with a past medical history of recurrent cystitis and pyelonephritis, Depression, Cocaine abuse, Insomnia presenting for fever, increased urinary frequency and foul smelling urine.    Sepsis secondary to Right pyelonephritis  - status post Ceftriaxone 1g and NS 1L bolus  - Currently hemodynamically stable, no longer tachycardic, afebrile  - Start Meropenem 1g IV every 8 hours because there is a risk of MDR GN infection in the setting of recent use of bactrim (fluoroquinolone, TMP-SMX, broad spec beta lactam 3rd or 4th gen [facility]). De-escalate depending on urine cx.  - IVF NS 75cc/hr  - F/u blood and urine cx  - No need for urology; no hydronephrosis or obstructing calculus and no intervention in acute setting. Follow up as outpatient for possible cystoscopy.  - Zofran 4mg prn  - Note: Urine cx July 13th showed E.coli sensitive to Ceftriaxone. Will follow guidelines in the setting of recent Bactrim use and recurrent UTI with Pyelonephritis.    Headache; Tylenol prn    Depression; bupropion, fluoxetine, sertraline    DVT ppx; Lovenox 30  Diet, regular  Full Code

## 2018-08-09 NOTE — H&P ADULT - PSH
H/O tubal ligation Facial trauma, subsequent encounter  status post reconstruction  H/O tubal ligation

## 2018-08-09 NOTE — H&P ADULT - ATTENDING COMMENTS
Patient seen and examined independently of housestaff.  The patient complains of persistent lower back pain.    49yo F with Past Medical History Recurrent Cystitis and Pyelonephritis, Depression, Cocaine abuse, and Insomnia admitted with fevers, increased urinary frequency, and foul smelling urine secondary to recurrent cystitis.    Sepsis secondary to acute pyelonephritis: continue IV Merrem and follow-up urine and blood cultures.  Continue gentle IV hydration.  Tylenol PRN for fever or pain.  Trend CBC and renal function.  Patient denies any recent sexual activity (abstinence >1 year).  No need for STI screening at this time.  Patient would benefit from a referral to urology or uro-gynecology upon discharge      Depression: continue bupropion, fluoxetine, sertraline    GI/DVT prophylaxis

## 2018-08-10 LAB
ANION GAP SERPL CALC-SCNC: 10 MMOL/L — SIGNIFICANT CHANGE UP (ref 7–14)
BASOPHILS # BLD AUTO: 0.03 K/UL — SIGNIFICANT CHANGE UP (ref 0–0.2)
BASOPHILS NFR BLD AUTO: 0.3 % — SIGNIFICANT CHANGE UP (ref 0–1)
BUN SERPL-MCNC: 8 MG/DL — LOW (ref 10–20)
CALCIUM SERPL-MCNC: 8.5 MG/DL — SIGNIFICANT CHANGE UP (ref 8.5–10.1)
CHLORIDE SERPL-SCNC: 107 MMOL/L — SIGNIFICANT CHANGE UP (ref 98–110)
CO2 SERPL-SCNC: 27 MMOL/L — SIGNIFICANT CHANGE UP (ref 17–32)
CREAT SERPL-MCNC: 0.6 MG/DL — LOW (ref 0.7–1.5)
EOSINOPHIL # BLD AUTO: 0.55 K/UL — SIGNIFICANT CHANGE UP (ref 0–0.7)
EOSINOPHIL NFR BLD AUTO: 6.3 % — SIGNIFICANT CHANGE UP (ref 0–8)
GLUCOSE SERPL-MCNC: 109 MG/DL — HIGH (ref 70–99)
HCT VFR BLD CALC: 28.3 % — LOW (ref 37–47)
HGB BLD-MCNC: 9.7 G/DL — LOW (ref 12–16)
IMM GRANULOCYTES NFR BLD AUTO: 0.3 % — SIGNIFICANT CHANGE UP (ref 0.1–0.3)
LYMPHOCYTES # BLD AUTO: 2.23 K/UL — SIGNIFICANT CHANGE UP (ref 1.2–3.4)
LYMPHOCYTES # BLD AUTO: 25.3 % — SIGNIFICANT CHANGE UP (ref 20.5–51.1)
MAGNESIUM SERPL-MCNC: 2.1 MG/DL — SIGNIFICANT CHANGE UP (ref 1.8–2.4)
MCHC RBC-ENTMCNC: 27 PG — SIGNIFICANT CHANGE UP (ref 27–31)
MCHC RBC-ENTMCNC: 34.3 G/DL — SIGNIFICANT CHANGE UP (ref 32–37)
MCV RBC AUTO: 78.8 FL — LOW (ref 81–99)
MONOCYTES # BLD AUTO: 0.77 K/UL — HIGH (ref 0.1–0.6)
MONOCYTES NFR BLD AUTO: 8.8 % — SIGNIFICANT CHANGE UP (ref 1.7–9.3)
NEUTROPHILS # BLD AUTO: 5.19 K/UL — SIGNIFICANT CHANGE UP (ref 1.4–6.5)
NEUTROPHILS NFR BLD AUTO: 59 % — SIGNIFICANT CHANGE UP (ref 42.2–75.2)
NRBC # BLD: 0 /100 WBCS — SIGNIFICANT CHANGE UP (ref 0–0)
PLATELET # BLD AUTO: 308 K/UL — SIGNIFICANT CHANGE UP (ref 130–400)
POTASSIUM SERPL-MCNC: 4.3 MMOL/L — SIGNIFICANT CHANGE UP (ref 3.5–5)
POTASSIUM SERPL-SCNC: 4.3 MMOL/L — SIGNIFICANT CHANGE UP (ref 3.5–5)
RBC # BLD: 3.59 M/UL — LOW (ref 4.2–5.4)
RBC # FLD: 14.6 % — HIGH (ref 11.5–14.5)
SODIUM SERPL-SCNC: 144 MMOL/L — SIGNIFICANT CHANGE UP (ref 135–146)
WBC # BLD: 8.8 K/UL — SIGNIFICANT CHANGE UP (ref 4.8–10.8)
WBC # FLD AUTO: 8.8 K/UL — SIGNIFICANT CHANGE UP (ref 4.8–10.8)

## 2018-08-10 RX ORDER — CEFTRIAXONE 500 MG/1
INJECTION, POWDER, FOR SOLUTION INTRAMUSCULAR; INTRAVENOUS
Qty: 0 | Refills: 0 | Status: DISCONTINUED | OUTPATIENT
Start: 2018-08-10 | End: 2018-08-11

## 2018-08-10 RX ORDER — CEFTRIAXONE 500 MG/1
INJECTION, POWDER, FOR SOLUTION INTRAMUSCULAR; INTRAVENOUS
Qty: 0 | Refills: 0 | Status: DISCONTINUED | OUTPATIENT
Start: 2018-08-10 | End: 2018-08-10

## 2018-08-10 RX ORDER — CEFTRIAXONE 500 MG/1
1 INJECTION, POWDER, FOR SOLUTION INTRAMUSCULAR; INTRAVENOUS ONCE
Qty: 0 | Refills: 0 | Status: COMPLETED | OUTPATIENT
Start: 2018-08-10 | End: 2018-08-10

## 2018-08-10 RX ORDER — CEFTRIAXONE 500 MG/1
1000 INJECTION, POWDER, FOR SOLUTION INTRAMUSCULAR; INTRAVENOUS ONCE
Qty: 0 | Refills: 0 | Status: DISCONTINUED | OUTPATIENT
Start: 2018-08-10 | End: 2018-08-11

## 2018-08-10 RX ORDER — CEFTRIAXONE 500 MG/1
1 INJECTION, POWDER, FOR SOLUTION INTRAMUSCULAR; INTRAVENOUS EVERY 24 HOURS
Qty: 0 | Refills: 0 | Status: DISCONTINUED | OUTPATIENT
Start: 2018-08-11 | End: 2018-08-11

## 2018-08-10 RX ADMIN — CEFTRIAXONE 100 GRAM(S): 500 INJECTION, POWDER, FOR SOLUTION INTRAMUSCULAR; INTRAVENOUS at 12:56

## 2018-08-10 RX ADMIN — BUPRENORPHINE AND NALOXONE 1 TABLET(S): 2; .5 TABLET SUBLINGUAL at 12:56

## 2018-08-10 RX ADMIN — Medication 40 MILLIGRAM(S): at 12:23

## 2018-08-10 RX ADMIN — ENOXAPARIN SODIUM 30 MILLIGRAM(S): 100 INJECTION SUBCUTANEOUS at 12:23

## 2018-08-10 RX ADMIN — SODIUM CHLORIDE 75 MILLILITER(S): 9 INJECTION INTRAMUSCULAR; INTRAVENOUS; SUBCUTANEOUS at 19:52

## 2018-08-10 RX ADMIN — MEROPENEM 100 MILLIGRAM(S): 1 INJECTION INTRAVENOUS at 05:08

## 2018-08-10 RX ADMIN — QUETIAPINE FUMARATE 200 MILLIGRAM(S): 200 TABLET, FILM COATED ORAL at 21:06

## 2018-08-10 NOTE — PROGRESS NOTE ADULT - ASSESSMENT
Assessment and Plan:  1) sepsis due to UTI and right pyelonephritis  -continue IV Ceftriaxone (we d/cd meropenim due to urine cx showing ecoli sensitive to ceftriaxone).   -f/up Blood cx.    2) Microcytic anemia  - iron/tibc levels      3) h/o depression -stable, resumed on home regimen tx.    4)DVT proph. with Lovenox daily injections

## 2018-08-10 NOTE — PROGRESS NOTE ADULT - SUBJECTIVE AND OBJECTIVE BOX
SUBJECTIVE:    Patient is lying in bed resting, she states that she has had no fevers, chills, flank pain, abdominal pain, or suprapubic pain. She has not had dysuria either. Has been stooling and urinating.     PAST MEDICAL & SURGICAL HISTORY  Depressed  Tobacco abuse  Insomnia  Cocaine abuse  Chronic back pain  Facial trauma, subsequent encounter: status post reconstruction  H/O tubal ligation    SOCIAL HISTORY:  Negative for smoking/alcohol/drug use.     ALLERGIES:  No Known Allergies    MEDICATIONS:  STANDING MEDICATIONS  buprenorphine 8 mG/naloxone 2 mG SL  Tablet 1 Tablet(s) SubLingual daily  cefTRIAXone   IVPB      cefTRIAXone Injectable. 1000 milliGRAM(s) IV Push once  enoxaparin Injectable 30 milliGRAM(s) SubCutaneous daily  FLUoxetine 40 milliGRAM(s) Oral daily  QUEtiapine 200 milliGRAM(s) Oral at bedtime  sodium chloride 0.9%. 1000 milliLiter(s) IV Continuous <Continuous>    PRN MEDICATIONS  acetaminophen   Tablet 650 milliGRAM(s) Oral every 6 hours PRN  ondansetron Injectable 4 milliGRAM(s) IV Push every 6 hours PRN    VITALS:   T(F): 98  HR: 97  BP: 120/71  RR: 19  SpO2: 96%    LABS:                        9.7    8.80  )-----------( 308      ( 10 Aug 2018 09:01 )             28.3     08-10    144  |  107  |  8<L>  ----------------------------<  109<H>  4.3   |  27  |  0.6<L>    Ca    8.5      10 Aug 2018 09:01  Mg     2.1     08-10    TPro  5.7<L>  /  Alb  3.4<L>  /  TBili  0.2  /  DBili  x   /  AST  47<H>  /  ALT  103<H>  /  AlkPhos  81  08-09              Culture - Urine (collected 08 Aug 2018 16:05)  Source: .Urine Clean Catch (Midstream)  Final Report (09 Aug 2018 21:40):    No growth    Culture - Blood (collected 08 Aug 2018 14:11)  Source: .Blood Blood-Peripheral  Preliminary Report (10 Aug 2018 01:03):    No growth to date.          RADIOLOGY:    PHYSICAL EXAM:  GEN: No acute distress  LUNGS: Clear to auscultation bilaterally   HEART: S1/S2 present. RRR.   ABD: Soft, non-tender, non-distended. Bowel sounds present  EXT: NC/NC/NE/2+PP/LOPEZ  NEURO: AAOX3

## 2018-08-10 NOTE — PROGRESS NOTE ADULT - SUBJECTIVE AND OBJECTIVE BOX
BRANDI CEBALLOS  [facility]            Patient was evaluated and examined  by bedside, no abdominal pain, no dysuria                REVIEW OF SYSTEMS:  please see pertinent positives mentioned above, all other 12 ROS negative      T(C): , Max: 37.6 (08-09-18 @ 16:18)  HR: 78 (08-10-18 @ 05:44)  BP: 91/55 (08-10-18 @ 05:44)  RR: 18 (08-10-18 @ 05:44)  SpO2: 96% (08-09-18 @ 19:50)  CAPILLARY BLOOD GLUCOSE          PHYSICAL EXAM:  General: NAD, AAOX3, patient is laying comfortably in bed  HEENT: AT, NC, Supple, NO JVD, NO CB  Lungs: CTA B/L, no wheezing, no rhonchi  CVS: normal S1, S2, RRR, NO M/G/R  Abdomen: soft, bowel sounds present, non-tender, non-distended  Extremities: no edema, no clubbing, no cyanosis, positive peripheral pulses b/l  Neuro: no acute focal neurological deficits  Skin: no rush, no ecchymosis      LAB  CBC  Date: 08-10-18 @ 09:01  Mean cell Kiqehmtnxo63.0  Mean cell Hemoglobin Conc34.3  Mean cell Volum 78.8  Platelet count-Automate 308  RBC Count 3.59  Red Cell Distrib Width14.6  WBC Count8.80  % Albumin, Urine--  Hematocrit 28.3  Hemoglobin 9.7  CBC  Date: 08-09-18 @ 06:29  Mean cell Qhliyuerjn58.7  Mean cell Hemoglobin Conc33.7  Mean cell Volum 79.3  Platelet count-Automate 268  RBC Count 3.48  Red Cell Distrib Width14.6  WBC Count10.37  % Albumin, Urine--  Hematocrit 27.6  Hemoglobin 9.3  CBC  Date: 08-08-18 @ 14:30  Mean cell Jeetzxclrd59.7  Mean cell Hemoglobin Conc34.9  Mean cell Volum 79.4  Platelet count-Automate 237  RBC Count 3.54  Red Cell Distrib Width14.5  WBC Count11.61  % Albumin, Urine--  Hematocrit 28.1  Hemoglobin 9.8    BMP  08-10-18 @ 09:01  Blood Gas Arterial-Calcium,Ionized--  Blood Urea Nitrogen, Serum 8 mg/dL<L> [10 - 20]  Carbon Dioxide, Serum27 mmol/L [17 - 32]  Chloride, Xutsn934 mmol/L [98 - 110]  Creatinie, Serum0.6 mg/dL<L> [0.7 - 1.5]  Glucose, Wxbjk489 mg/dL<H> [70 - 99]  Potassium, Serum4.3 mmol/L [3.5 - 5.0]  Sodium, Serum 144 mmol/L [135 - 146]  BMP  08-09-18 @ 06:29  Blood Gas Arterial-Calcium,Ionized--  Blood Urea Nitrogen, Serum 7 mg/dL<L> [10 - 20]  Carbon Dioxide, Serum28 mmol/L [17 - 32]  Chloride, Cmbfk944 mmol/L [98 - 110]  Creatinie, Serum0.6 mg/dL<L> [0.7 - 1.5]  Glucose, Serum84 mg/dL [70 - 99]  Potassium, Serum4.2 mmol/L [3.5 - 5.0]  Sodium, Serum 145 mmol/L [135 - 146]  BMP  08-08-18 @ 14:30  Blood Gas Arterial-Calcium,Ionized--  Blood Urea Nitrogen, Serum 6 mg/dL<L> [10 - 20]  Carbon Dioxide, Serum25 mmol/L [17 - 32]  Chloride, Serum96 mmol/L<L> [98 - 110]  Creatinie, Serum0.6 mg/dL<L> [0.7 - 1.5]  Glucose, Osidw369 mg/dL<H> [70 - 99]  Potassium, Serum3.7 mmol/L [3.5 - 5.0]  Sodium, Serum 138 mmol/L [135 - 146]              Microbiology:    Culture - Urine (collected 08-08-18 @ 16:05)  Source: .Urine Clean Catch (Midstream)  Final Report (08-09-18 @ 21:40):    No growth    Culture - Blood (collected 08-08-18 @ 14:11)  Source: .Blood Blood-Peripheral  Preliminary Report (08-10-18 @ 01:03):    No growth to date.    Culture - Urine (collected 07-18-18 @ 12:29)  Source: .Urine Clean Catch (Midstream)  Final Report (07-19-18 @ 21:58):    No growth    Culture - Urine (collected 07-13-18 @ 13:30)  Source: .Urine Clean Catch (Midstream)  Final Report (07-15-18 @ 18:25):    >100,000 CFU/ml Escherichia coli  Organism: Escherichia coli (07-15-18 @ 18:25)  Organism: Escherichia coli (07-15-18 @ 18:25)      -  Amikacin: S <=8      -  Amoxicillin/Clavulanic Acid: S <=8/4      -  Ampicillin: R >16      -  Ampicillin/Sulbactam: I 16/8      -  Aztreonam: S <=4      -  Cefazolin: S <=2      -  Cefepime: S <=2      -  Cefoxitin: S <=4      -  Ceftriaxone: S <=1      -  Ciprofloxacin: S <=0.5      -  Ertapenem: S <=0.5      -  Gentamicin: S <=1      -  Imipenem: S <=1      -  Levofloxacin: S <=1      -  Meropenem: S <=1      -  Nitrofurantoin: S <=32      -  Piperacillin/Tazobactam: S <=8      -  Tigecycline: S <=1      -  Tobramycin: S <=2      -  Trimethoprim/Sulfamethoxazole: S <=0.5/9.5      Method Type: COCO        Medications:  acetaminophen   Tablet 650 milliGRAM(s) Oral every 6 hours PRN  buprenorphine 8 mG/naloxone 2 mG SL  Tablet 1 Tablet(s) SubLingual daily  cefTRIAXone   IVPB      cefTRIAXone Injectable. 1000 milliGRAM(s) IV Push once  enoxaparin Injectable 30 milliGRAM(s) SubCutaneous daily  FLUoxetine 40 milliGRAM(s) Oral daily  ondansetron Injectable 4 milliGRAM(s) IV Push every 6 hours PRN  QUEtiapine 200 milliGRAM(s) Oral at bedtime  sodium chloride 0.9%. 1000 milliLiter(s) IV Continuous <Continuous>        Assessment and Plan:  1) sepsis due to UTI and right pyelonephritis  -continue IV Ceftriaxone  -f/up Blood and urine cxs    2) Microcytic anemia  - iron/tibc levels      3) h/o depression -stable, resumed on home regimen tx.    4)DVT proph. with Lovenox daily injections

## 2018-08-11 VITALS
TEMPERATURE: 98 F | SYSTOLIC BLOOD PRESSURE: 102 MMHG | RESPIRATION RATE: 16 BRPM | HEART RATE: 72 BPM | DIASTOLIC BLOOD PRESSURE: 59 MMHG

## 2018-08-11 LAB
HCT VFR BLD CALC: 31.3 % — LOW (ref 37–47)
HGB BLD-MCNC: 10.4 G/DL — LOW (ref 12–16)
MCHC RBC-ENTMCNC: 26.6 PG — LOW (ref 27–31)
MCHC RBC-ENTMCNC: 33.2 G/DL — SIGNIFICANT CHANGE UP (ref 32–37)
MCV RBC AUTO: 80.1 FL — LOW (ref 81–99)
NRBC # BLD: 0 /100 WBCS — SIGNIFICANT CHANGE UP (ref 0–0)
PLATELET # BLD AUTO: 367 K/UL — SIGNIFICANT CHANGE UP (ref 130–400)
RBC # BLD: 3.91 M/UL — LOW (ref 4.2–5.4)
RBC # FLD: 14.8 % — HIGH (ref 11.5–14.5)
WBC # BLD: 8.09 K/UL — SIGNIFICANT CHANGE UP (ref 4.8–10.8)
WBC # FLD AUTO: 8.09 K/UL — SIGNIFICANT CHANGE UP (ref 4.8–10.8)

## 2018-08-11 RX ORDER — CEFUROXIME AXETIL 250 MG
1 TABLET ORAL
Qty: 21 | Refills: 0 | OUTPATIENT
Start: 2018-08-11 | End: 2018-08-17

## 2018-08-11 RX ADMIN — Medication 40 MILLIGRAM(S): at 12:03

## 2018-08-11 RX ADMIN — SODIUM CHLORIDE 75 MILLILITER(S): 9 INJECTION INTRAMUSCULAR; INTRAVENOUS; SUBCUTANEOUS at 05:17

## 2018-08-11 RX ADMIN — BUPRENORPHINE AND NALOXONE 1 TABLET(S): 2; .5 TABLET SUBLINGUAL at 12:02

## 2018-08-11 NOTE — PROGRESS NOTE ADULT - SUBJECTIVE AND OBJECTIVE BOX
SUBJECTIVE:    No dysuria, flank pain, abdominal pain. Is tolerating diet well. No fevers, chills. Plans to follow up with urologist as outpatient.      PAST MEDICAL & SURGICAL HISTORY  Depressed  Tobacco abuse  Insomnia  Cocaine abuse  Chronic back pain  Facial trauma, subsequent encounter: status post reconstruction  H/O tubal ligation    SOCIAL HISTORY:  Negative for smoking/alcohol/drug use.     ALLERGIES:  No Known Allergies    MEDICATIONS:  STANDING MEDICATIONS  buprenorphine 8 mG/naloxone 2 mG SL  Tablet 1 Tablet(s) SubLingual daily  cefTRIAXone   IVPB      cefTRIAXone   IVPB 1 Gram(s) IV Intermittent every 24 hours  cefTRIAXone Injectable. 1000 milliGRAM(s) IV Push once  enoxaparin Injectable 30 milliGRAM(s) SubCutaneous daily  FLUoxetine 40 milliGRAM(s) Oral daily  QUEtiapine 200 milliGRAM(s) Oral at bedtime  sodium chloride 0.9%. 1000 milliLiter(s) IV Continuous <Continuous>    PRN MEDICATIONS  acetaminophen   Tablet 650 milliGRAM(s) Oral every 6 hours PRN  ondansetron Injectable 4 milliGRAM(s) IV Push every 6 hours PRN    VITALS:   T(F): 98.3  HR: 80  BP: 112/58  RR: 18  SpO2: --    LABS:                        9.7    8.80  )-----------( 308      ( 10 Aug 2018 09:01 )             28.3     08-10    144  |  107  |  8<L>  ----------------------------<  109<H>  4.3   |  27  |  0.6<L>    Ca    8.5      10 Aug 2018 09:01  Mg     2.1     08-10                Culture - Urine (collected 08 Aug 2018 16:05)  Source: .Urine Clean Catch (Midstream)  Final Report (09 Aug 2018 21:40):    No growth    Culture - Blood (collected 08 Aug 2018 14:11)  Source: .Blood Blood-Peripheral  Preliminary Report (10 Aug 2018 01:03):    No growth to date.          RADIOLOGY:    PHYSICAL EXAM:  GEN: No acute distress  LUNGS: Clear to auscultation bilaterally   HEART: S1/S2 present. RRR.   ABD: Soft, non-tender, non-distended. Bowel sounds present  EXT: NC/NC/NE/2+PP/LOPEZ  NEURO: AAOX3

## 2018-08-11 NOTE — DISCHARGE NOTE ADULT - MEDICATION SUMMARY - MEDICATIONS TO TAKE
I will START or STAY ON the medications listed below when I get home from the hospital:    buprenorphine-naloxone 8 mg-2 mg sublingual tablet  -- 1  under tongue once a day  -- Indication: For H/O DRUG USE    buprenorphine-naloxone 8 mg-2 mg sublingual tablet  -- 1  under tongue once a day  -- Indication: For H/O DRUG USE    FLUoxetine 40 mg oral capsule  -- 1 cap(s) by mouth once a day  -- Indication: For DEPRESSION    QUEtiapine 200 mg oral tablet  -- 1 tab(s) by mouth once a day (at bedtime)  -- Indication: For DEPRESSION    cefuroxime 500 mg oral tablet  -- 1 tab(s) by mouth 3 times a day   -- Finish all this medication unless otherwise directed by prescriber.  Medication should be taken with plenty of water.  Take with food or milk.    -- Indication: For UTI

## 2018-08-11 NOTE — DISCHARGE NOTE ADULT - CARE PLAN
Principal Discharge DX:	Pyelonephritis  Goal:	RESOLUTION OF SYMPTOMS  Assessment and plan of treatment:	PLEASE COMPLETE ANTIBIOTICS THERAPY, F/UP WITH UROLOGY SPECIALIST

## 2018-08-11 NOTE — DISCHARGE NOTE ADULT - PATIENT PORTAL LINK FT
You can access the [facility] Patient Portal, offered by [facility], by registering with the following website: [email or web address]

## 2018-08-11 NOTE — DISCHARGE NOTE ADULT - PROVIDER TOKENS
FREE:[LAST:[FOLLOW UP WITH FAMILY DOCTOR AND UROLOGY SPECIALIST IN 7 DAYS],PHONE:[(   )    -],FAX:[(   )    -]]

## 2018-08-11 NOTE — DISCHARGE NOTE ADULT - HOSPITAL COURSE
Patient has been treated for acute right pyelonephritis and urinary tract infection, patient anticipated to complete antibiotics therapy and follow up with urology specialist post d/c home. for chronic medical conditions , patient was resumed on home regimen therapy.

## 2018-08-11 NOTE — PROGRESS NOTE ADULT - ATTENDING COMMENTS
Patient was evaluated and examined by bedside, no c/o abdominal pain, tolerating diet well.    All labs, radiology studies, VS was reviewed  I agree with medical plan outlined by Medical resident as stated above.  -patient is medically stable for d/c home today  -urine cxs -no growth  -upon d/c for acute right pyelonephritis patient prescribed Ceftin 500 mg po twice daily to complete 7 more days. with outpatient  f/up  -for chronic medical conditions -resumed on home regimen tx.

## 2018-08-14 LAB
CULTURE RESULTS: SIGNIFICANT CHANGE UP
SPECIMEN SOURCE: SIGNIFICANT CHANGE UP

## 2018-08-20 DIAGNOSIS — N39.0 URINARY TRACT INFECTION, SITE NOT SPECIFIED: ICD-10-CM

## 2018-08-20 DIAGNOSIS — D50.9 IRON DEFICIENCY ANEMIA, UNSPECIFIED: ICD-10-CM

## 2018-08-20 DIAGNOSIS — M54.9 DORSALGIA, UNSPECIFIED: ICD-10-CM

## 2018-08-20 DIAGNOSIS — A41.9 SEPSIS, UNSPECIFIED ORGANISM: ICD-10-CM

## 2018-08-20 DIAGNOSIS — G89.29 OTHER CHRONIC PAIN: ICD-10-CM

## 2018-08-20 DIAGNOSIS — F32.9 MAJOR DEPRESSIVE DISORDER, SINGLE EPISODE, UNSPECIFIED: ICD-10-CM

## 2018-08-20 DIAGNOSIS — G47.00 INSOMNIA, UNSPECIFIED: ICD-10-CM

## 2018-08-20 DIAGNOSIS — Z87.440 PERSONAL HISTORY OF URINARY (TRACT) INFECTIONS: ICD-10-CM

## 2018-08-20 DIAGNOSIS — N12 TUBULO-INTERSTITIAL NEPHRITIS, NOT SPECIFIED AS ACUTE OR CHRONIC: ICD-10-CM

## 2018-08-20 DIAGNOSIS — F17.200 NICOTINE DEPENDENCE, UNSPECIFIED, UNCOMPLICATED: ICD-10-CM

## 2018-08-20 DIAGNOSIS — N10 ACUTE PYELONEPHRITIS: ICD-10-CM

## 2018-12-01 ENCOUNTER — OUTPATIENT (OUTPATIENT)
Dept: OUTPATIENT SERVICES | Facility: HOSPITAL | Age: 48
LOS: 1 days | End: 2018-12-01
Payer: MEDICAID

## 2018-12-01 DIAGNOSIS — S09.93XD UNSPECIFIED INJURY OF FACE, SUBSEQUENT ENCOUNTER: Chronic | ICD-10-CM

## 2018-12-01 DIAGNOSIS — Z98.51 TUBAL LIGATION STATUS: Chronic | ICD-10-CM

## 2018-12-06 DIAGNOSIS — Z71.89 OTHER SPECIFIED COUNSELING: ICD-10-CM

## 2019-01-01 PROCEDURE — G9001: CPT

## 2019-10-29 NOTE — DISCHARGE NOTE ADULT - VISION (WITH CORRECTIVE LENSES IF THE PATIENT USUALLY WEARS THEM):
Detail Level: Detailed Quality 130: Documentation Of Current Medications In The Medical Record: Current Medications Documented Quality 131: Pain Assessment And Follow-Up: Pain assessment using a standardized tool is documented as negative, no follow-up plan required Quality 402: Tobacco Use And Help With Quitting Among Adolescents: Patient screened for tobacco and never smoked Quality 110: Preventive Care And Screening: Influenza Immunization: Influenza Immunization previously received during influenza season Normal vision: sees adequately in most situations; can see medication labels, newsprint

## 2019-12-23 NOTE — PATIENT PROFILE ADULT. - PRO INTERPRETER NEED 2
Anesthesia Post Evaluation    Patient: Azucena Morrison    Procedure(s) Performed: Procedure(s) (LRB):  INCISION AND DRAINAGE, ABSCESS (N/A)    Final Anesthesia Type: general    Patient location during evaluation: PACU  Patient participation: Yes- Able to Participate  Level of consciousness: awake and alert  Post-procedure vital signs: reviewed and stable  Pain management: adequate  Airway patency: patent    PONV status at discharge: No PONV  Anesthetic complications: no      Cardiovascular status: blood pressure returned to baseline and hemodynamically stable  Respiratory status: unassisted  Hydration status: euvolemic  Follow-up not needed.          Vitals Value Taken Time   /76 12/22/2019  3:04 PM   Temp 36.4 °C (97.6 °F) 12/22/2019  3:04 PM   Pulse 78 12/22/2019  3:04 PM   Resp 18 12/22/2019  3:04 PM   SpO2 96 % 12/22/2019  3:04 PM         Event Time     Out of Recovery 14:16:00          Pain/Patience Score: Pain Rating Prior to Med Admin: 6 (12/22/2019  4:29 PM)  Pain Rating Post Med Admin: 0 (12/22/2019  2:00 PM)         English

## 2020-02-07 NOTE — ED PROVIDER NOTES
History  Chief Complaint   Patient presents with    Suicide Attempt     patient states she tried to kill herself - states she drank half of a bottle of 70% rubbing alcohol 1-2 hours ago  states she did the same thing a couple weeks ago  History provided by:  Patient  History limited by:  Psychiatric disorder   used: No    Suicide Attempt   Presenting symptoms: agitation, depression, suicidal thoughts, suicidal threats and suicide attempt    Presenting symptoms: no aggressive behavior, no bizarre behavior, no delusions, no disorganized speech, no disorganized thought process, no hallucinations, no homicidal ideas, no paranoid behavior and no self-mutilation    Presenting symptoms comment:  Intentional ingestion of isopropyl alcohol  Patient accompanied by: EMS  Degree of incapacity (severity): Unable to specify  Onset quality:  Gradual  Timing:  Constant  Progression:  Worsening  Chronicity:  Chronic  Context: alcohol use, drug abuse and noncompliance    Context: not medication, not recent medication change and not stressful life event    Treatment compliance:  Some of the time  Time since last psychoactive medication taken:  2 weeks  Relieved by:  Benzodiazepines (Alcohol)  Worsened by:  Alcohol, drugs and family interactions  Ineffective treatments:  None tried  Associated symptoms: abdominal pain, anhedonia, anxiety, irritability and poor judgment    Associated symptoms: no appetite change, no chest pain, no decreased need for sleep, not distractible, no euphoric mood, no fatigue, no feelings of worthlessness, no headaches, no hypersomnia, no hyperventilation and no insomnia    Risk factors: hx of mental illness, hx of suicide attempts and recent psychiatric admission    Risk factors: no neurological disease        Prior to Admission Medications   Prescriptions Last Dose Informant Patient Reported?  Taking?   acetaminophen (TYLENOL) 325 mg tablet Unknown at Unknown time  No No   Sig: I reviewed the H&P, I examined the patient, and there are no changes in the patient's condition.  RBA for procedure and alternatives reviewed. All questions answered. Informed consent obtained.   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   Take 2 tablets by mouth every 6 (six) hours as needed for mild pain, headaches or fever   clonazePAM (KlonoPIN) 0 5 mg tablet Unknown at Unknown time  No No   Sig: Take 1 tablet by mouth 3 (three) times a day as needed for seizures for up to 10 days   divalproex sodium (DEPAKOTE ER) 500 mg 24 hr tablet Unknown at Unknown time  No No   Sig: Take 1 tablet by mouth daily at bedtime   folic acid (FOLVITE) 1 mg tablet Unknown at Unknown time  No No   Sig: Take 1 tablet by mouth daily   gabapentin (NEURONTIN) 100 mg capsule Unknown at Unknown time  No No   Sig: Take 1 capsule by mouth 3 (three) times a day   haloperidol (HALDOL) 5 mg tablet Unknown at Unknown time  No No   Sig: Take 1 tablet by mouth 2 (two) times a day   nicotine (NICODERM CQ) 21 mg/24 hr TD 24 hr patch Unknown at Unknown time  No No   Sig: Place 1 patch on the skin daily   thiamine 100 MG tablet Unknown at Unknown time  No No   Sig: Take 1 tablet by mouth daily      Facility-Administered Medications: None       Past Medical History:   Diagnosis Date    Depression     Psychiatric disorder        History reviewed  No pertinent surgical history  History reviewed  No pertinent family history  I have reviewed and agree with the history as documented  Social History   Substance Use Topics    Smoking status: Current Every Day Smoker     Packs/day: 0 50    Smokeless tobacco: Never Used    Alcohol use Yes      Comment: "I don't know"        Review of Systems   Constitutional: Positive for irritability  Negative for appetite change, fatigue and fever  HENT: Negative for congestion and sore throat  Respiratory: Negative for cough, chest tightness and shortness of breath  Cardiovascular: Negative for chest pain, palpitations and leg swelling  Gastrointestinal: Positive for abdominal pain and constipation  Negative for abdominal distention, diarrhea, nausea and vomiting          LBM 3 days ago   Genitourinary: Negative for difficulty urinating and dysuria  Musculoskeletal: Negative for back pain  Skin: Negative for color change, pallor, rash and wound  Neurological: Positive for syncope  Negative for dizziness, weakness, light-headedness and headaches  Psychiatric/Behavioral: Positive for agitation, behavioral problems, dysphoric mood, sleep disturbance and suicidal ideas  Negative for hallucinations, homicidal ideas, paranoia and self-injury  The patient is nervous/anxious  The patient does not have insomnia  Physical Exam  ED Triage Vitals   Temperature Pulse Respirations Blood Pressure SpO2   10/24/17 0850 10/24/17 0850 10/24/17 0850 10/24/17 0850 10/24/17 0850   (!) 96 9 °F (36 1 °C) 100 18 118/71 97 %      Temp Source Heart Rate Source Patient Position - Orthostatic VS BP Location FiO2 (%)   10/24/17 0850 10/24/17 0850 10/24/17 0850 10/24/17 0850 --   Tympanic Monitor Sitting Right arm       Pain Score       10/24/17 0855       Worst Possible Pain           Physical Exam   Constitutional: She is oriented to person, place, and time  She appears well-developed and well-nourished  No distress  HENT:   Head: Normocephalic and atraumatic  Mouth/Throat: Oropharynx is clear and moist    Eyes: Conjunctivae and EOM are normal    Neck: Normal range of motion  Neck supple  Cardiovascular: Normal rate, regular rhythm and intact distal pulses  Pulmonary/Chest: Effort normal and breath sounds normal    Abdominal: Soft  She exhibits no distension  There is no tenderness  Musculoskeletal: Normal range of motion  Neurological: She is alert and oriented to person, place, and time  Skin: Skin is warm and dry  She is not diaphoretic     Psychiatric:   Flat affect  Dysphoric mood, tearful-continues to repeat self stating that she does not want to live anymore and wants to be admitted to the hospital  Agitated at times, labile mood  [place] concerned with getting narcotic pain medication for total body aches and pains, chronic   Nursing note and vitals reviewed  ED Medications  Medications   chlordiazePOXIDE (LIBRIUM) capsule 50 mg (50 mg Oral Given 10/24/17 1408)   sodium chloride 0 9 % bolus 1,000 mL (0 mL Intravenous Stopped 10/24/17 1125)   acetaminophen (TYLENOL) tablet 975 mg (975 mg Oral Given 10/24/17 1120)       Diagnostic Studies  Labs Reviewed   CBC AND DIFFERENTIAL - Abnormal        Result Value Ref Range Status    RDW 16 0 (*) 11 6 - 15 1 % Final    MPV 7 6 (*) 8 9 - 12 7 fL Final    Neutrophils Relative 81 (*) 43 - 75 % Final    WBC 8 90  4 80 - 10 80 Thousand/uL Final    RBC 4 46  4 20 - 5 40 Million/uL Final    Hemoglobin 12 6  12 0 - 16 0 g/dL Final    Hematocrit 39 0  37 0 - 47 0 % Final    MCV 87  82 - 98 fL Final    MCH 28 3  27 0 - 31 0 pg Final    MCHC 32 4  31 4 - 37 4 g/dL Final    Platelets 426  014 - 400 Thousands/uL Final    nRBC 0  /100 WBCs Final    Lymphocytes Relative 14  14 - 44 % Final    Monocytes Relative 4  4 - 12 % Final    Eosinophils Relative 1  0 - 6 % Final    Basophils Relative 0  0 - 1 % Final    Neutrophils Absolute 7 30  1 85 - 7 62 Thousands/µL Final    Lymphocytes Absolute 1 30  0 60 - 4 47 Thousands/µL Final    Monocytes Absolute 0 30  0 17 - 1 22 Thousand/µL Final    Eosinophils Absolute 0 10  0 00 - 0 61 Thousand/µL Final    Basophils Absolute 0 00  0 00 - 0 10 Thousands/µL Final   COMPREHENSIVE METABOLIC PANEL - Abnormal     Sodium 146 (*) 136 - 145 mmol/L Final    Potassium 3 4 (*) 3 5 - 5 3 mmol/L Final    Chloride 110 (*) 100 - 108 mmol/L Final    BUN 4 (*) 5 - 25 mg/dL Final    CO2 28  21 - 32 mmol/L Final    Anion Gap 8  4 - 13 mmol/L Final    Creatinine 1 22  0 60 - 1 30 mg/dL Final    Comment: Standardized to IDMS reference method    Glucose 119  65 - 140 mg/dL Final    Comment:   If the patient is fasting, the ADA then defines impaired fasting glucose as > 100 mg/dL and diabetes as > or equal to 123 mg/dL    Specimen collection should occur prior to Sulfasalazine administration due to the potential for falsely depressed results  Specimen collection should occur prior to Sulfapyridine administration due to the potential for falsely elevated results  Calcium 9 0  8 3 - 10 1 mg/dL Final    AST 22  5 - 45 U/L Final    Comment:   Specimen collection should occur prior to Sulfasalazine administration due to the potential for falsely depressed results  ALT 63  12 - 78 U/L Final    Comment:   Specimen collection should occur prior to Sulfasalazine administration due to the potential for falsely depressed results  Alkaline Phosphatase 63  46 - 116 U/L Final    Total Protein 6 8  6 4 - 8 2 g/dL Final    Albumin 3 6  3 5 - 5 0 g/dL Final    Total Bilirubin 0 20  0 20 - 1 00 mg/dL Final    eGFR 60  ml/min/1 73sq m Final    Narrative:     National Kidney Disease Education Program recommendations are as follows:  GFR calculation is accurate only with a steady state creatinine  Chronic Kidney disease less than 60 ml/min/1 73 sq  meters  Kidney failure less than 15 ml/min/1 73 sq  meters     UA W REFLEX TO MICROSCOPIC WITH REFLEX TO CULTURE - Abnormal     Ketones, UA 15 (1+) (*) Negative mg/dl Final    Blood, UA Trace-Intact (*) Negative Final    Color, UA Light Yellow   Final    Clarity, UA Clear   Final    Specific Ruth, UA 1 010  1 000 - 1 030 Final    pH, UA 8 0  5 0 - 9 0 Final    Leukocytes, UA Negative  Negative Final    Nitrite, UA Negative  Negative Final    Protein, UA Negative  Negative mg/dl Final    Glucose, UA Negative  Negative mg/dl Final    Urobilinogen, UA 0 2  0 2, 1 0 E U /dl E U /dl Final    Bilirubin, UA Negative  Negative Final   RAPID DRUG SCREEN, URINE - Abnormal     Benzodiazepine Urine Positive (*) Negative Final    Cocaine Urine Positive (*) Negative Final    Opiate Urine Positive (*) Negative Final    Amph/Meth UR Negative  Negative Final    Barbiturate Ur Negative  Negative Final    Methadone Urine Negative  Negative Final    PCP Ur Negative  Negative Final    THC Urine Negative  Negative Final    Narrative:     Presumptive report  If requested, specimen will be sent to reference lab for confirmation  FOR MEDICAL PURPOSES ONLY  IF CONFIRMATION NEEDED PLEASE CONTACT THE LAB WITHIN 5 DAYS  Drug Screen Cutoff Levels:  AMPHETAMINE/METHAMPHETAMINES  1000 ng/mL  BARBITURATES     200 ng/mL  BENZODIAZEPINES     200 ng/mL  COCAINE      300 ng/mL  METHADONE      300 ng/mL  OPIATES      300 ng/mL  PHENCYCLIDINE     25 ng/mL  THC       50 ng/mL   VOLATILE COMPOUNDS - Abnormal     Acetone Positive (*) Negative Final    Comment: Toxcicity: Acetone     >20  mg/dL or 0 02%    Isopropanol Positive (*) Negative Final    Comment: Toxcicity: Isopropanol >20 mg/dL  or 0 02%    Acetone, Quant 71  mg/dL Final    Ethanol Negative  Negative Final    Comment: Toxcicity: Ethanol     >200 mg/dL or 0 20%    Isopropanol 20  mg/dL Final    Methanol Negative  Negative Final    Comment: Toxcicity: Methanol    >10 mg/dL  or 0 01%    Narrative: Any amount may be potentially toxic  Test performed at [address] Francia Aschoff, M D  -    REE[facility] EllsworthNicholas [facility] Noyes Memorial Hospitalign Director   REECE Archer   -  [facility])   P: [phone]   www [employer]    SALICYLATE LEVEL - Abnormal     Salicylate Lvl <3 (*) 3 - 20 mg/dL Final   ACETAMINOPHEN LEVEL - Abnormal     Acetaminophen Level <2 (*) 10 - 30 ug/mL Final   VALPROIC ACID LEVEL, TOTAL - Abnormal     Valproic Acid, Total <3 (*) 50 - 100 ug/mL Final    Comment: 3   URINE MICROSCOPIC - Abnormal     RBC, UA 0-1 (*) None Seen, 0-5 /hpf Final    Hyaline Casts, UA 0-1 (*) (none) /lpf Final    WBC, UA None Seen  None Seen, 0-5, 5-55, 5-65 /hpf Final    Epithelial Cells Occasional  None Seen, Occasional /hpf Final    Bacteria, UA Occasional  None Seen, Occasional /hpf Final    MUCOUS THREADS Occasional  Occasional, Moderate, Innumerable Final   LIPASE - Normal Lipase 92  73 - 393 u/L Final   MEDICAL ALCOHOL - Normal    Ethanol Lvl <3  0 - 3 mg/dL Final    Comment: 3   MAGNESIUM - Normal    Magnesium 2 1  1 6 - 2 6 mg/dL Final       XR abdomen 1 view kub   Final Result      No acute findings  Workstation performed: [id number]         XR chest 1 view portable   Final Result      No active pulmonary disease           Workstation performed: [id number]             Procedures  Procedures      Phone Contacts  ED Phone Contact    ED Course  ED Course                                MDM  Number of Diagnoses or Management Options  Depression: established and worsening  Overdose: new and requires workup  Suicidal behavior with attempted self-injury: new and requires workup  Diagnosis management comments: R/o vol and metabolic/electrolyte derrangement, co-ingestion, acute intox, withdrawal syndrome  - EKG  - Labs, including EtOH level  - UA  - [facility] consult  - PRN analgesia, symptom management  - Re-eval, dispo        Amount and/or Complexity of Data Reviewed  Clinical lab tests: ordered and reviewed  Tests in the radiology section of CPT®: ordered and reviewed  Tests in the medicine section of CPT®: reviewed and ordered  Decide to obtain previous medical records or to obtain history from someone other than the patient: yes  Review and summarize past medical records: yes  Discuss the patient with other providers: yes [facility] - agrees with current eval and management, recommended adding depakote level not in initial orders, otherwise cleared   [place] - Crisis - vol inpt psych adm  )  Independent visualization of images, tracings, or specimens: yes    Risk of Complications, Morbidity, and/or Mortality  Presenting problems: moderate  Diagnostic procedures: low  Management options: low  General comments: Intentional isopropyl alcohol ingestion  Suicidal attempt  Polysubstance/EtOH abuse  MDD    Patient Progress  Patient progress: stable (Pt med cleared  Pending final dispo for vol inpt psych adm)    CritCare Time    Disposition  Final diagnoses:   None     ED Disposition     None      Follow-up Information    None       Patient's Medications   Discharge Prescriptions    No medications on file     No discharge procedures on file      ED Provider  Electronically Signed by       Kiki Quinones MD  10/24/17 Leodan Kunz MD  10/24/17 4251

## 2023-06-09 NOTE — PROGRESS NOTES
10/24/17 @ 150:  50year-old [place] female, came to ED on her own after ingesting rubbing alcohol to kill herself  Patient said that she went to [place], but only stayed there a few days, then went to rehab in [place], but that didn't go well because of medical issues, and she was discharged to her son's house  Patient remains suicidal and is begging for help saying, "Please don't discharge me to the street; I need some help "  Patient presented to [address] [place] [address] several weeks ago with suicide attempt by same means and was admitted to [facility]   Patient is voluntary and will require bed search  Please see copy of crisis assessment for further details    Hank Stapleton MS
[facility] called, Jackie Tracey; they need note of medical clearance; pregnancy test; date of last seizure; ER staff informed  Faxed missing information to [facility] @ 17:40 - called to verify receipt  Called [facility] @ 18:35 - spoke with Jackie Tracey - pt was accepted at [facility] but still is awaiting medical clearance - they will fax a voluntary form to ER; pt and ER staff updated  Faxed signed voluntary for [place] @ 18:45 - original placed on pt's chart in envelope  [facility] / Jackie Tracey called at 19:45 - requests the ER attending call poison control and fax the result of that note to them - pt will not medically clear until this step is completed  Faxed note to [facility] @ 20:30 - called to verify receipt and spoke with Jackie Tracey  [facility] / Jackie Tracey called at 21:45 - pt accepted at [facility] - psychiatrically by Dr Rocío Piedra and medically by Dr Terri Brittle; Rn report can be called to [phone]  [facility] called for transport, scheduled  time is 00:00  Pt, ER staff and [facility] all updated 
H&P: No change in H&P    PHYSICIAN SIGNATURE:  Electronically signed by TIMUR Vargas CNP on 6/9/23 at 1:02 PM EDT

## 2023-09-19 NOTE — ED PROVIDER NOTE - NSTIMEPROVIDERCAREINITIATE_GEN_ER
12-Feb-2018 10:08 Detail Level: Simple Plan: F/u in 4-6 weeks Render In Strict Bullet Format?: No Initiate Treatment: Begin to use clobetasol pruritus on legs Discontinue Regimen: Tofacitinib

## 2024-04-08 NOTE — ED PROVIDER NOTE - CROS ED ENMT ALL NEG
PAST SURGICAL HISTORY:  H/O arthroscopy of knee     H/O colonoscopy     History of appendectomy     History of cholecystectomy     History of tonsillectomy     
- - -